# Patient Record
Sex: FEMALE | Race: WHITE | NOT HISPANIC OR LATINO | Employment: OTHER | URBAN - METROPOLITAN AREA
[De-identification: names, ages, dates, MRNs, and addresses within clinical notes are randomized per-mention and may not be internally consistent; named-entity substitution may affect disease eponyms.]

---

## 2017-09-28 ENCOUNTER — HOSPITAL ENCOUNTER (OUTPATIENT)
Dept: MAMMOGRAPHY | Facility: HOSPITAL | Age: 72
Discharge: HOME | End: 2017-09-28
Attending: NURSE PRACTITIONER | Admitting: NURSE PRACTITIONER

## 2018-11-26 ENCOUNTER — HOSPITAL ENCOUNTER (OUTPATIENT)
Dept: MAMMOGRAPHY | Facility: HOSPITAL | Age: 73
Discharge: HOME | End: 2018-11-26
Attending: FAMILY MEDICINE | Admitting: FAMILY MEDICINE

## 2019-06-20 ENCOUNTER — TELEPHONE (OUTPATIENT)
Dept: FAMILY MEDICINE CLINIC | Facility: CLINIC | Age: 74
End: 2019-06-20

## 2019-07-02 LAB
AMBIG ABBREV LP DEFAULT: NORMAL
CHOLEST SERPL-MCNC: 195 MG/DL (ref 100–199)
HDLC SERPL-MCNC: 39 MG/DL
LDLC SERPL CALC-MCNC: 114 MG/DL (ref 0–99)
TRIGL SERPL-MCNC: 212 MG/DL (ref 0–149)
VLDLC SERPL CALC-MCNC: 42 MG/DL (ref 5–40)

## 2019-07-06 ENCOUNTER — RESULTS ENCOUNTER (OUTPATIENT)
Dept: FAMILY MEDICINE CLINIC | Facility: CLINIC | Age: 74
End: 2019-07-06

## 2019-07-06 DIAGNOSIS — E78.49 OTHER HYPERLIPIDEMIA: ICD-10-CM

## 2019-10-29 ENCOUNTER — FLU SHOT (OUTPATIENT)
Dept: FAMILY MEDICINE CLINIC | Facility: CLINIC | Age: 74
End: 2019-10-29

## 2019-10-29 DIAGNOSIS — Z23 IMMUNIZATION, PNEUMOCOCCUS AND INFLUENZA: ICD-10-CM

## 2019-10-29 PROCEDURE — 90653 IIV ADJUVANT VACCINE IM: CPT | Performed by: FAMILY MEDICINE

## 2019-10-29 PROCEDURE — G0008 ADMIN INFLUENZA VIRUS VAC: HCPCS | Performed by: FAMILY MEDICINE

## 2019-11-25 ENCOUNTER — OFFICE VISIT (OUTPATIENT)
Dept: FAMILY MEDICINE CLINIC | Facility: CLINIC | Age: 74
End: 2019-11-25

## 2019-11-25 VITALS
SYSTOLIC BLOOD PRESSURE: 145 MMHG | WEIGHT: 206 LBS | HEIGHT: 66 IN | TEMPERATURE: 98.3 F | DIASTOLIC BLOOD PRESSURE: 72 MMHG | OXYGEN SATURATION: 94 % | BODY MASS INDEX: 33.11 KG/M2 | HEART RATE: 78 BPM

## 2019-11-25 DIAGNOSIS — R73.9 HYPERGLYCEMIA: ICD-10-CM

## 2019-11-25 DIAGNOSIS — E66.09 CLASS 1 OBESITY DUE TO EXCESS CALORIES WITHOUT SERIOUS COMORBIDITY WITH BODY MASS INDEX (BMI) OF 33.0 TO 33.9 IN ADULT: ICD-10-CM

## 2019-11-25 DIAGNOSIS — H69.83 DYSFUNCTION OF BOTH EUSTACHIAN TUBES: ICD-10-CM

## 2019-11-25 DIAGNOSIS — N18.30 CHRONIC RENAL INSUFFICIENCY, STAGE III (MODERATE) (HCC): ICD-10-CM

## 2019-11-25 DIAGNOSIS — E55.9 VITAMIN D DEFICIENCY: ICD-10-CM

## 2019-11-25 DIAGNOSIS — I10 ESSENTIAL HYPERTENSION: Primary | ICD-10-CM

## 2019-11-25 DIAGNOSIS — E78.2 MIXED HYPERLIPIDEMIA: ICD-10-CM

## 2019-11-25 DIAGNOSIS — R60.0 PEDAL EDEMA: ICD-10-CM

## 2019-11-25 DIAGNOSIS — J30.9 ALLERGIC RHINITIS, UNSPECIFIED SEASONALITY, UNSPECIFIED TRIGGER: ICD-10-CM

## 2019-11-25 PROBLEM — F32.A DEPRESSION: Status: ACTIVE | Noted: 2019-11-25

## 2019-11-25 PROBLEM — I47.1 PAROXYSMAL ATRIAL TACHYCARDIA (HCC): Status: ACTIVE | Noted: 2019-11-25

## 2019-11-25 PROBLEM — M85.80 OSTEOPENIA: Status: ACTIVE | Noted: 2017-08-03

## 2019-11-25 PROBLEM — I47.19 PAROXYSMAL ATRIAL TACHYCARDIA: Status: ACTIVE | Noted: 2019-11-25

## 2019-11-25 PROCEDURE — 99214 OFFICE O/P EST MOD 30 MIN: CPT | Performed by: FAMILY MEDICINE

## 2019-11-25 RX ORDER — METOPROLOL SUCCINATE 25 MG/1
25 TABLET, EXTENDED RELEASE ORAL DAILY
COMMUNITY

## 2019-11-25 RX ORDER — FENOFIBRATE 54 MG/1
54 TABLET ORAL DAILY
COMMUNITY
Start: 2019-07-01 | End: 2019-12-02

## 2019-11-25 RX ORDER — FLUTICASONE PROPIONATE 50 MCG
2 SPRAY, SUSPENSION (ML) NASAL DAILY
Qty: 1 BOTTLE | Refills: 3 | Status: SHIPPED | OUTPATIENT
Start: 2019-11-25 | End: 2021-09-07 | Stop reason: SDUPTHER

## 2019-11-25 NOTE — PROGRESS NOTES
Chief Complaint   Patient presents with   • Hypertension   • Hyperlipidemia       Subjective   History of Present Illness   Anjana Martínez is a 74 y.o. female. Pt here to establish care.  She is concerned about a red discoloration to her anterior lower legs that have been treated by Dr Starr and Dr Brwon.  She states that Dr Starr told her it was erythema nodosum Dr. Brown told her it was vascular. Pt states the areas have never felt like nodules or raised areas and do not hurt.     She reports she is currently taking fenofibrate, has taken simvastatin in the past and it caused knee and leg pain, uncertain if she has taken other statins.  Does not believe she has taken Crestor.    She has started weight watchers and is hoping weight loss will improve her blood pressure, the past couple of months she has lost from 225 down to 206 pounds     Pt has had her Flu shot this season.  States had a PNU shot a year ago checking chirp she had the PPV 23 on 8/3/2017.    The following portions of the patient's history were reviewed and updated as appropriate: allergies, current medications, past family history, past medical history, past social history, past surgical history and problem list.    Current Outpatient Medications on File Prior to Visit   Medication Sig   • Cholecalciferol (CVS D3) 50 MCG (2000 UT) capsule CVS D3 2000 UNIT CAPS   • fenofibrate (TRICOR) 54 MG tablet Take 54 mg by mouth Daily.   • metoprolol succinate XL (TOPROL-XL) 25 MG 24 hr tablet Take 25 mg by mouth Daily.     No current facility-administered medications on file prior to visit.          Review of Systems   Constitutional: Negative for appetite change, fatigue, fever and unexpected weight loss.   HENT: Positive for congestion, postnasal drip and rhinorrhea.    Eyes: Negative for visual disturbance.   Respiratory: Negative for cough, shortness of breath and wheezing.    Cardiovascular: Negative for chest pain, palpitations and leg swelling.  "  Gastrointestinal: Negative for abdominal pain, constipation, diarrhea, nausea, vomiting and indigestion.   Skin: Positive for rash.   Allergic/Immunologic: Positive for environmental allergies.   Neurological: Negative for numbness and headache.   Psychiatric/Behavioral: Negative for sleep disturbance and depressed mood.       Past Medical History:   Diagnosis Date   • Hyperlipidemia    • Hypertension        Past Surgical History:   Procedure Laterality Date   • PACEMAKER IMPLANTATION         Family History   Problem Relation Age of Onset   • Cancer Mother    • Cirrhosis Mother    • Heart disease Father    • Cirrhosis Brother    • No Known Problems Daughter    • No Known Problems Brother    • No Known Problems Daughter        Social History     Socioeconomic History   • Marital status:      Spouse name: Not on file   • Number of children: Not on file   • Years of education: Not on file   • Highest education level: Not on file   Tobacco Use   • Smoking status: Never Smoker   • Smokeless tobacco: Never Used   Substance and Sexual Activity   • Alcohol use: No     Frequency: Never   • Drug use: No       Objective   Vitals:    11/25/19 1436   BP: 145/72   BP Location: Left arm   Patient Position: Sitting   Cuff Size: Adult   Pulse: 78   Temp: 98.3 °F (36.8 °C)   TempSrc: Oral   SpO2: 94%   Weight: 93.4 kg (206 lb)   Height: 167.6 cm (65.98\")      Body mass index is 33.27 kg/m².    Physical Exam   Constitutional: She is oriented to person, place, and time. She appears well-developed and well-nourished.   HENT:   Head: Normocephalic and atraumatic.   cobblestoning of post pharynx, no exudate.   and mild edema of nasal mucosa  TM's retracted bilaterally   Eyes: Conjunctivae and EOM are normal. Pupils are equal, round, and reactive to light.   Neck: No JVD present. No thyromegaly present.   Cardiovascular: Normal rate, regular rhythm and normal heart sounds.   No murmur heard.  Pulmonary/Chest: Breath sounds normal. " She has no wheezes. She has no rales.   Musculoskeletal: She exhibits no edema.   Lymphadenopathy:     She has no cervical adenopathy.   Neurological: She is alert and oriented to person, place, and time.   Skin: Skin is warm and dry.   Mild non pitting edema and varicosities and spider veins of lower ext bilaterally, no exudate   Psychiatric: She has a normal mood and affect.   Nursing note and vitals reviewed.      Lab Results   Component Value Date    GLU 97 11/25/2019    BUN 25 11/25/2019    CREATININE 1.45 (H) 11/25/2019    EGFRIFNONA 36 (L) 11/25/2019    EGFRIFAFRI 42 (L) 11/25/2019     11/25/2019    K 4.7 11/25/2019     11/25/2019    CALCIUM 10.3 11/25/2019    ALBUMIN 4.9 (H) 11/25/2019    BILITOT 0.5 11/25/2019    ALKPHOS 60 11/25/2019    AST 25 11/25/2019    ALT 34 (H) 11/25/2019    CHLPL 197 11/25/2019    TRIG 164 (H) 11/25/2019    HDL 39 (L) 11/25/2019    VLDL 33 11/25/2019     (H) 11/25/2019    WBC 6.7 11/25/2019    RBC 4.99 11/25/2019    HCT 42.4 11/25/2019    MCV 85 11/25/2019    MCH 28.7 11/25/2019    TSH 2.460 11/25/2019    KUKU17UH 39.0 11/25/2019 7/1/2019 lipid panel cholesterol 195, triglycerides 212, HDL 39, , GFR 52;  A1c 4/4/2019 was 5.9, cholesterol 255, triglycerides 329, , HDL 34  The 10-year ASCVD risk score (Chelsea GERRY Jr., et al., 2013) is: 20.1%    Values used to calculate the score:      Age: 74 years      Sex: Female      Is Non- : No      Diabetic: No      Tobacco smoker: No      Systolic Blood Pressure: 145 mmHg      Is BP treated: Yes      HDL Cholesterol: 39 mg/dL      Total Cholesterol: 197 mg/dL    Assessment/Plan   Diagnoses and all orders for this visit:    1. Essential hypertension (Primary)  -     Lipid Panel  -     TSH  -     CBC & Differential    2. Chronic renal insufficiency, stage III (moderate) (CMS/HCC)    3. Class 1 obesity due to excess calories without serious comorbidity with body mass index (BMI) of 33.0  to 33.9 in adult    4. Hyperglycemia  -     Hemoglobin A1c  -     Comprehensive Metabolic Panel    5. Vitamin D deficiency  -     Vitamin D 25 Hydroxy    6. Pedal edema    7. Mixed hyperlipidemia  -     Lipid Panel    8. Allergic rhinitis, unspecified seasonality, unspecified trigger  -     fluticasone (FLONASE) 50 MCG/ACT nasal spray; 2 sprays into the nostril(s) as directed by provider Daily.  Dispense: 1 bottle; Refill: 3    9. Dysfunction of both eustachian tubes  -     fluticasone (FLONASE) 50 MCG/ACT nasal spray; 2 sprays into the nostril(s) as directed by provider Daily.  Dispense: 1 bottle; Refill: 3        HTN above goal pt declines increase in antihypertensive therapy stating she is wanting to continue weight loss and will follow DASH diet and monitor with home cuff, if remains >140/90 she will contact office to adjust medication    Advised LE vascular insufficiency is most likely cause of redness of legs and low salt diet, compression hose, elevation  and weight loss are the most effective treatments.     Adding Fluticasone for ETD, nasal congestion and seasonal allergies.      Return in about 6 months (around 5/25/2020).      AVS given with handouts appropriate to diagnoses addressed inclluding ETD and Dyslipidemia

## 2019-11-25 NOTE — PATIENT INSTRUCTIONS
Dyslipidemia  Dyslipidemia is an imbalance of waxy, fat-like substances (lipids) in the blood. The body needs lipids in small amounts. Dyslipidemia often involves a high level of cholesterol or triglycerides, which are types of lipids.  Common forms of dyslipidemia include:  · High levels of LDL cholesterol. LDL is the type of cholesterol that causes fatty deposits (plaques) to build up in the blood vessels that carry blood away from your heart (arteries).  · Low levels of HDL cholesterol. HDL cholesterol is the type of cholesterol that protects against heart disease. High levels of HDL remove the LDL buildup from arteries.  · High levels of triglycerides. Triglycerides are a fatty substance in the blood that is linked to a buildup of plaques in the arteries.  What are the causes?  Primary dyslipidemia is caused by changes (mutations) in genes that are passed down through families (inherited). These mutations cause several types of dyslipidemia.  Secondary dyslipidemia is caused by lifestyle choices and diseases that lead to dyslipidemia, such as:  · Eating a diet that is high in animal fat.  · Not getting enough exercise.  · Having diabetes, kidney disease, liver disease, or thyroid disease.  · Drinking large amounts of alcohol.  · Using certain medicines.  What increases the risk?  You are more likely to develop this condition if you are an older man or if you are a woman who has gone through menopause. Other risk factors include:  · Having a family history of dyslipidemia.  · Taking certain medicines, including birth control pills, steroids, some diuretics, and beta-blockers.  · Smoking cigarettes.  · Eating a high-fat diet.  · Having certain medical conditions such as diabetes, polycystic ovary syndrome (PCOS), kidney disease, liver disease, or hypothyroidism.  · Not exercising regularly.  · Being overweight or obese with too much belly fat.  What are the signs or symptoms?  In most cases, dyslipidemia does not  usually cause any symptoms.  In severe cases, very high lipid levels can cause:  · Fatty bumps under the skin (xanthomas).  · White or gray ring around the black center (pupil) of the eye.  Very high triglyceride levels can cause inflammation of the pancreas (pancreatitis).  How is this diagnosed?  Your health care provider may diagnose dyslipidemia based on a routine blood test (fasting blood test). Because most people do not have symptoms of the condition, this blood testing (lipid profile) is done on adults age 20 and older and is repeated every 5 years. This test checks:  · Total cholesterol. This measures the total amount of cholesterol in your blood, including LDL cholesterol, HDL cholesterol, and triglycerides. A healthy number is below 200.  · LDL cholesterol. The target number for LDL cholesterol is different for each person, depending on individual risk factors. Ask your health care provider what your LDL cholesterol should be.  · HDL cholesterol. An HDL level of 60 or higher is best because it helps to protect against heart disease. A number below 40 for men or below 50 for women increases the risk for heart disease.  · Triglycerides. A healthy triglyceride number is below 150.  If your lipid profile is abnormal, your health care provider may do other blood tests.  How is this treated?  Treatment depends on the type of dyslipidemia that you have and your other risk factors for heart disease and stroke. Your health care provider will have a target range for your lipid levels based on this information.  For many people, this condition may be treated by lifestyle changes, such as diet and exercise. Your health care provider may recommend that you:  · Get regular exercise.  · Make changes to your diet.  · Quit smoking if you smoke.  If diet changes and exercise do not help you reach your goals, your health care provider may also prescribe medicine to lower lipids. The most commonly prescribed type of medicine  lowers your LDL cholesterol (statin drug). If you have a high triglyceride level, your provider may prescribe another type of drug (fibrate) or an omega-3 fish oil supplement, or both.  Follow these instructions at home:    Eating and drinking  · Follow instructions from your health care provider or dietitian about eating or drinking restrictions.  · Eat a healthy diet as told by your health care provider. This can help you reach and maintain a healthy weight, lower your LDL cholesterol, and raise your HDL cholesterol. This may include:  ? Limiting your calories, if you are overweight.  ? Eating more fruits, vegetables, whole grains, fish, and lean meats.  ? Limiting saturated fat, trans fat, and cholesterol.  · If you drink alcohol:  ? Limit how much you use.  ? Be aware of how much alcohol is in your drink. In the [place] one drink equals one 12 oz bottle of beer (355 mL), one 5 oz glass of wine (148 mL), or one 1½ oz glass of hard liquor (44 mL).  · Do not drink alcohol if:  ? Your health care provider tells you not to drink.  ? You are pregnant, may be pregnant, or are planning to become pregnant.  Activity  · Get regular exercise. Start an exercise and strength training program as told by your health care provider. Ask your health care provider what activities are safe for you. Your health care provider may recommend:  ? 30 minutes of aerobic activity 4-6 days a week. Brisk walking is an example of aerobic activity.  ? Strength training 2 days a week.  General instructions  · Do not use any products that contain nicotine or tobacco, such as cigarettes, e-cigarettes, and chewing tobacco. If you need help quitting, ask your health care provider.  · Take over-the-counter and prescription medicines only as told by your health care provider. This includes supplements.  · Keep all follow-up visits as told by your health care provider.  Contact a health care provider if:  · You are:  ? Having trouble sticking to your  exercise or diet plan.  ? Struggling to quit smoking or control your use of alcohol.  Summary  · Dyslipidemia often involves a high level of cholesterol or triglycerides, which are types of lipids.  · Treatment depends on the type of dyslipidemia that you have and your other risk factors for heart disease and stroke.  · For many people, treatment starts with lifestyle changes, such as diet and exercise.  · Your health care provider may prescribe medicine to lower lipids.  This information is not intended to replace advice given to you by your health care provider. Make sure you discuss any questions you have with your health care provider.  Document Released: 12/23/2014 Document Revised: 08/12/2019 Document Reviewed: 07/19/2019  [employer] Interactive Patient Education © 2019 [employer]

## 2019-11-26 LAB
25(OH)D3+25(OH)D2 SERPL-MCNC: 39 NG/ML (ref 30–100)
ALBUMIN SERPL-MCNC: 4.9 G/DL (ref 3.5–4.8)
ALBUMIN/GLOB SERPL: 2 {RATIO} (ref 1.2–2.2)
ALP SERPL-CCNC: 60 IU/L (ref 39–117)
ALT SERPL-CCNC: 34 IU/L (ref 0–32)
AST SERPL-CCNC: 25 IU/L (ref 0–40)
BASOPHILS # BLD AUTO: 0 X10E3/UL (ref 0–0.2)
BASOPHILS NFR BLD AUTO: 0 %
BILIRUB SERPL-MCNC: 0.5 MG/DL (ref 0–1.2)
BUN SERPL-MCNC: 25 MG/DL (ref 8–27)
BUN/CREAT SERPL: 17 (ref 12–28)
CALCIUM SERPL-MCNC: 10.3 MG/DL (ref 8.7–10.3)
CHLORIDE SERPL-SCNC: 103 MMOL/L (ref 96–106)
CHOLEST SERPL-MCNC: 197 MG/DL (ref 100–199)
CO2 SERPL-SCNC: 22 MMOL/L (ref 20–29)
CREAT SERPL-MCNC: 1.45 MG/DL (ref 0.57–1)
EOSINOPHIL # BLD AUTO: 0.4 X10E3/UL (ref 0–0.4)
EOSINOPHIL NFR BLD AUTO: 6 %
ERYTHROCYTE [DISTWIDTH] IN BLOOD BY AUTOMATED COUNT: 13.3 % (ref 12.3–15.4)
GLOBULIN SER CALC-MCNC: 2.5 G/DL (ref 1.5–4.5)
GLUCOSE SERPL-MCNC: 97 MG/DL (ref 65–99)
HBA1C MFR BLD: 5.8 % (ref 4.8–5.6)
HCT VFR BLD AUTO: 42.4 % (ref 34–46.6)
HDLC SERPL-MCNC: 39 MG/DL
HGB BLD-MCNC: 14.3 G/DL (ref 11.1–15.9)
IMM GRANULOCYTES # BLD AUTO: 0 X10E3/UL (ref 0–0.1)
IMM GRANULOCYTES NFR BLD AUTO: 0 %
LDLC SERPL CALC-MCNC: 125 MG/DL (ref 0–99)
LYMPHOCYTES # BLD AUTO: 1.8 X10E3/UL (ref 0.7–3.1)
LYMPHOCYTES NFR BLD AUTO: 27 %
MCH RBC QN AUTO: 28.7 PG (ref 26.6–33)
MCHC RBC AUTO-ENTMCNC: 33.7 G/DL (ref 31.5–35.7)
MCV RBC AUTO: 85 FL (ref 79–97)
MONOCYTES # BLD AUTO: 0.4 X10E3/UL (ref 0.1–0.9)
MONOCYTES NFR BLD AUTO: 6 %
NEUTROPHILS # BLD AUTO: 4.1 X10E3/UL (ref 1.4–7)
NEUTROPHILS NFR BLD AUTO: 61 %
PLATELET # BLD AUTO: 269 X10E3/UL (ref 150–450)
POTASSIUM SERPL-SCNC: 4.7 MMOL/L (ref 3.5–5.2)
PROT SERPL-MCNC: 7.4 G/DL (ref 6–8.5)
RBC # BLD AUTO: 4.99 X10E6/UL (ref 3.77–5.28)
SODIUM SERPL-SCNC: 140 MMOL/L (ref 134–144)
TRIGL SERPL-MCNC: 164 MG/DL (ref 0–149)
TSH SERPL DL<=0.005 MIU/L-ACNC: 2.46 UIU/ML (ref 0.45–4.5)
VLDLC SERPL CALC-MCNC: 33 MG/DL (ref 5–40)
WBC # BLD AUTO: 6.7 X10E3/UL (ref 3.4–10.8)

## 2019-11-27 DIAGNOSIS — E78.2 MIXED HYPERLIPIDEMIA: Primary | ICD-10-CM

## 2019-11-27 DIAGNOSIS — R73.9 HYPERGLYCEMIA: ICD-10-CM

## 2019-11-27 DIAGNOSIS — I10 ESSENTIAL HYPERTENSION: ICD-10-CM

## 2019-11-30 PROBLEM — H69.93 DYSFUNCTION OF BOTH EUSTACHIAN TUBES: Status: ACTIVE | Noted: 2019-11-30

## 2019-11-30 PROBLEM — E78.2 MIXED HYPERLIPIDEMIA: Status: ACTIVE | Noted: 2019-11-30

## 2019-11-30 PROBLEM — J30.9 ALLERGIC RHINITIS: Status: ACTIVE | Noted: 2019-11-30

## 2019-11-30 PROBLEM — H69.83 DYSFUNCTION OF BOTH EUSTACHIAN TUBES: Status: ACTIVE | Noted: 2019-11-30

## 2019-12-02 DIAGNOSIS — R73.9 HYPERGLYCEMIA: ICD-10-CM

## 2019-12-02 DIAGNOSIS — Z12.31 BREAST CANCER SCREENING BY MAMMOGRAM: Primary | ICD-10-CM

## 2019-12-02 DIAGNOSIS — I10 ESSENTIAL HYPERTENSION: ICD-10-CM

## 2019-12-02 DIAGNOSIS — Z12.39 SCREENING BREAST EXAMINATION: Primary | ICD-10-CM

## 2019-12-02 DIAGNOSIS — E78.2 MIXED HYPERLIPIDEMIA: ICD-10-CM

## 2019-12-02 RX ORDER — ROSUVASTATIN CALCIUM 10 MG/1
10 TABLET, COATED ORAL DAILY
Qty: 90 TABLET | Refills: 0 | Status: SHIPPED | OUTPATIENT
Start: 2019-12-02 | End: 2020-02-12 | Stop reason: SDUPTHER

## 2019-12-02 RX ORDER — ROSUVASTATIN CALCIUM 10 MG/1
10 TABLET, COATED ORAL DAILY
COMMUNITY
End: 2019-12-02 | Stop reason: SDUPTHER

## 2019-12-12 ENCOUNTER — HOSPITAL ENCOUNTER (OUTPATIENT)
Dept: MAMMOGRAPHY | Facility: HOSPITAL | Age: 74
Discharge: HOME | End: 2019-12-12
Admitting: FAMILY MEDICINE

## 2019-12-12 DIAGNOSIS — Z12.31 BREAST CANCER SCREENING BY MAMMOGRAM: ICD-10-CM

## 2019-12-12 PROCEDURE — 77067 SCR MAMMO BI INCL CAD: CPT

## 2019-12-12 PROCEDURE — 77063 BREAST TOMOSYNTHESIS BI: CPT

## 2019-12-13 RX ORDER — FENOFIBRATE 54 MG/1
TABLET ORAL
Qty: 90 TABLET | Refills: 1 | OUTPATIENT
Start: 2019-12-13

## 2020-02-07 ENCOUNTER — RESULTS ENCOUNTER (OUTPATIENT)
Dept: FAMILY MEDICINE CLINIC | Facility: CLINIC | Age: 75
End: 2020-02-07

## 2020-02-07 DIAGNOSIS — R73.9 HYPERGLYCEMIA: ICD-10-CM

## 2020-02-07 DIAGNOSIS — I10 ESSENTIAL HYPERTENSION: ICD-10-CM

## 2020-02-07 DIAGNOSIS — E78.2 MIXED HYPERLIPIDEMIA: ICD-10-CM

## 2020-02-12 DIAGNOSIS — Z12.39 SCREENING BREAST EXAMINATION: ICD-10-CM

## 2020-02-12 RX ORDER — ROSUVASTATIN CALCIUM 10 MG/1
10 TABLET, COATED ORAL DAILY
Qty: 90 TABLET | Refills: 1 | Status: SHIPPED | OUTPATIENT
Start: 2020-02-12 | End: 2020-05-26

## 2020-02-12 NOTE — TELEPHONE ENCOUNTER
Pt is running out of this medications and will run out before her appt.  She is not having any problems with this medication.

## 2020-02-29 LAB
ALBUMIN SERPL-MCNC: 4.5 G/DL (ref 3.7–4.7)
ALBUMIN/GLOB SERPL: 1.9 {RATIO} (ref 1.2–2.2)
ALP SERPL-CCNC: 81 IU/L (ref 39–117)
ALT SERPL-CCNC: 20 IU/L (ref 0–32)
AST SERPL-CCNC: 16 IU/L (ref 0–40)
BILIRUB SERPL-MCNC: 0.5 MG/DL (ref 0–1.2)
BUN SERPL-MCNC: 27 MG/DL (ref 8–27)
BUN/CREAT SERPL: 20 (ref 12–28)
CALCIUM SERPL-MCNC: 9.5 MG/DL (ref 8.7–10.3)
CHLORIDE SERPL-SCNC: 107 MMOL/L (ref 96–106)
CHOLEST SERPL-MCNC: 116 MG/DL (ref 100–199)
CO2 SERPL-SCNC: 20 MMOL/L (ref 20–29)
CREAT SERPL-MCNC: 1.36 MG/DL (ref 0.57–1)
GLOBULIN SER CALC-MCNC: 2.4 G/DL (ref 1.5–4.5)
GLUCOSE SERPL-MCNC: 95 MG/DL (ref 65–99)
HBA1C MFR BLD: 5.8 % (ref 4.8–5.6)
HDLC SERPL-MCNC: 33 MG/DL
LDLC SERPL CALC-MCNC: 50 MG/DL (ref 0–99)
POTASSIUM SERPL-SCNC: 4.4 MMOL/L (ref 3.5–5.2)
PROT SERPL-MCNC: 6.9 G/DL (ref 6–8.5)
SODIUM SERPL-SCNC: 142 MMOL/L (ref 134–144)
TRIGL SERPL-MCNC: 163 MG/DL (ref 0–149)
VLDLC SERPL CALC-MCNC: 33 MG/DL (ref 5–40)

## 2020-03-01 ENCOUNTER — RESULTS ENCOUNTER (OUTPATIENT)
Dept: FAMILY MEDICINE CLINIC | Facility: CLINIC | Age: 75
End: 2020-03-01

## 2020-03-01 DIAGNOSIS — E78.2 MIXED HYPERLIPIDEMIA: ICD-10-CM

## 2020-03-01 DIAGNOSIS — I10 ESSENTIAL HYPERTENSION: ICD-10-CM

## 2020-03-01 DIAGNOSIS — R73.9 HYPERGLYCEMIA: ICD-10-CM

## 2020-03-03 DIAGNOSIS — I10 ESSENTIAL HYPERTENSION: ICD-10-CM

## 2020-03-04 RX ORDER — METOPROLOL SUCCINATE 25 MG/1
TABLET, EXTENDED RELEASE ORAL
Qty: 90 TABLET | OUTPATIENT
Start: 2020-03-04

## 2020-05-26 ENCOUNTER — OFFICE VISIT (OUTPATIENT)
Dept: FAMILY MEDICINE CLINIC | Facility: CLINIC | Age: 75
End: 2020-05-26

## 2020-05-26 DIAGNOSIS — E78.2 MIXED HYPERLIPIDEMIA: ICD-10-CM

## 2020-05-26 DIAGNOSIS — R73.02 IGT (IMPAIRED GLUCOSE TOLERANCE): ICD-10-CM

## 2020-05-26 DIAGNOSIS — E55.9 VITAMIN D DEFICIENCY: ICD-10-CM

## 2020-05-26 DIAGNOSIS — I10 ESSENTIAL HYPERTENSION: ICD-10-CM

## 2020-05-26 DIAGNOSIS — N18.30 CHRONIC RENAL INSUFFICIENCY, STAGE III (MODERATE) (HCC): Primary | ICD-10-CM

## 2020-05-26 PROCEDURE — 99442 PR PHYS/QHP TELEPHONE EVALUATION 11-20 MIN: CPT | Performed by: FAMILY MEDICINE

## 2020-05-26 RX ORDER — ROSUVASTATIN CALCIUM 5 MG/1
5 TABLET, COATED ORAL DAILY
Qty: 90 TABLET | Refills: 0
Start: 2020-05-26 | End: 2020-06-22

## 2020-05-26 NOTE — PROGRESS NOTES
Chief Complaint   Patient presents with   • Hypertension   • Hyperlipidemia       Subjective     Anjana Martínez is a 75 y.o. female.   You have chosen to receive care through a telephone visit. Do you consent to use a telephone visit for your medical care today?Yes.  I spent 18 minutes on phone with patient.    Patient is scheduled to follow-up on labs that she had done in February. Patient states she had labs in February and was to f/u in March and her appointment was cancelled (possibly due to the pandemic).   Hypertension - Patient states she does not check her bp at home very often. It has been a couple of months since she has checked it, she doesn't remember what the numbers were.  She believes she used to have systolics running in the 160s however looking through old records and finding mostly 130s to 145.  She does report one home blood pressure reading of 134/71 but thought this must of been a false low reading and did not believe her cuff was working correctly and has not been checking regularly since.    Hyperlipidemia, patient's was changed from fenofibrate to Crestor in November.  She is currently taking 10 mg and denies any side effects of myalgias or otherwise.  Incredible improvement in cholesterol as reported below.    Impaired glucose tolerance, A1c 5.8 is stable.    Obesity, patient reports she had lost about 30 pounds from her high around 225, but regained about 8 pounds.  She claims her current weight is 197 pounds this is down 9 pounds since November.      The following portions of the patient's history were reviewed and updated as appropriate: allergies, current medications, past family history, past medical history, past social history, past surgical history and problem list.    Current Outpatient Medications on File Prior to Visit   Medication Sig   • Cholecalciferol (CVS D3) 50 MCG (2000 UT) capsule CVS D3 2000 UNIT CAPS   • fluticasone (FLONASE) 50 MCG/ACT nasal spray 2 sprays into the  nostril(s) as directed by provider Daily.   • metoprolol succinate XL (TOPROL-XL) 25 MG 24 hr tablet Take 25 mg by mouth Daily.   • [DISCONTINUED] rosuvastatin (CRESTOR) 10 MG tablet Take 1 tablet by mouth Daily.     No current facility-administered medications on file prior to visit.      Medications Discontinued During This Encounter   Medication Reason   • rosuvastatin (CRESTOR) 10 MG tablet        Review of Systems   Constitutional: Negative for appetite change, fatigue, fever and unexpected weight loss.   HENT: Positive for congestion, postnasal drip and rhinorrhea.    Eyes: Negative for visual disturbance.   Respiratory: Negative for cough, shortness of breath and wheezing.    Cardiovascular: Negative for chest pain, palpitations and leg swelling.   Gastrointestinal: Negative for abdominal pain, constipation, diarrhea, nausea, vomiting and indigestion.   Skin: Positive for rash.   Allergic/Immunologic: Positive for environmental allergies.   Neurological: Negative for numbness and headache.   Psychiatric/Behavioral: Negative for sleep disturbance and depressed mood.        Objective   There were no vitals filed for this visit.   There is no height or weight on file to calculate BMI.    Physical Exam   Constitutional: She is oriented to person, place, and time.   Pulmonary/Chest:   No conversational dyspnea  No cough during encounter   Neurological: She is alert and oriented to person, place, and time.   Psychiatric: She has a normal mood and affect. Thought content normal.       Lab Results   Component Value Date    GLU 95 02/28/2020    BUN 27 02/28/2020    CREATININE 1.36 (H) 02/28/2020    EGFRIFNONA 39 (L) 02/28/2020    EGFRIFAFRI 45 (L) 02/28/2020     02/28/2020    K 4.4 02/28/2020     (H) 02/28/2020    CALCIUM 9.5 02/28/2020    ALBUMIN 4.5 02/28/2020    BILITOT 0.5 02/28/2020    ALKPHOS 81 02/28/2020    AST 16 02/28/2020    ALT 20 02/28/2020    CHLPL 116 02/28/2020    TRIG 163 (H) 02/28/2020     HDL 33 (L) 02/28/2020    VLDL 33 02/28/2020    LDL 50 02/28/2020      Lab Results   Component Value Date    HGBA1C 5.8 (H) 02/28/2020    HGBA1C 5.8 (H) 11/25/2019        Procedures     Assessment/Plan   Diagnoses and all orders for this visit:    1. Chronic renal insufficiency, stage III (moderate) (CMS/[facility]) (Primary)  -     Comprehensive Metabolic Panel    2. Essential hypertension    3. Mixed hyperlipidemia  -     rosuvastatin (CRESTOR) 5 MG tablet; Take 1 tablet by mouth Daily.  Dispense: 90 tablet; Refill: 0  -     Lipid Panel    4. IGT (impaired glucose tolerance)  -     Hemoglobin A1c    5. Vitamin D deficiency  -     Vitamin D 25 Hydroxy      Hypertension, advised patient to do some home blood pressure checks and contact office with readings, to ensure at goal.  Continue to record and bring to follow-up appointment in a month.    Hyperlipidemia, patient had significant improvement starting Crestor 10 mg, reducing to 5 mg daily and rechecking labs in about 1 month.    Renal insufficiency, GFR has remained stable, did stress that keeping blood pressure at goal, preventing onset of diabetes, and weight reduction are best treatments to avoid progression of renal disease.      Medications Discontinued During This Encounter   Medication Reason   • rosuvastatin (CRESTOR) 10 MG tablet           Return in about 4 weeks (around 6/23/2020) for Recheck, htn, cholesterol, IGT, vitamin D deficiency, labs prior.        AVS with updated medication list available on [employer]

## 2020-06-18 LAB
25(OH)D3+25(OH)D2 SERPL-MCNC: 43.4 NG/ML (ref 30–100)
ALBUMIN SERPL-MCNC: 4.4 G/DL (ref 3.7–4.7)
ALBUMIN/GLOB SERPL: 1.8 {RATIO} (ref 1.2–2.2)
ALP SERPL-CCNC: 75 IU/L (ref 39–117)
ALT SERPL-CCNC: 21 IU/L (ref 0–32)
AST SERPL-CCNC: 16 IU/L (ref 0–40)
BILIRUB SERPL-MCNC: 0.6 MG/DL (ref 0–1.2)
BUN SERPL-MCNC: 20 MG/DL (ref 8–27)
BUN/CREAT SERPL: 17 (ref 12–28)
CALCIUM SERPL-MCNC: 10 MG/DL (ref 8.7–10.3)
CHLORIDE SERPL-SCNC: 104 MMOL/L (ref 96–106)
CHOLEST SERPL-MCNC: 139 MG/DL (ref 100–199)
CO2 SERPL-SCNC: 24 MMOL/L (ref 20–29)
CREAT SERPL-MCNC: 1.19 MG/DL (ref 0.57–1)
GLOBULIN SER CALC-MCNC: 2.4 G/DL (ref 1.5–4.5)
GLUCOSE SERPL-MCNC: 95 MG/DL (ref 65–99)
HBA1C MFR BLD: 5.8 % (ref 4.8–5.6)
HDLC SERPL-MCNC: 37 MG/DL
LDLC SERPL CALC-MCNC: 61 MG/DL (ref 0–99)
POTASSIUM SERPL-SCNC: 4.7 MMOL/L (ref 3.5–5.2)
PROT SERPL-MCNC: 6.8 G/DL (ref 6–8.5)
SODIUM SERPL-SCNC: 142 MMOL/L (ref 134–144)
TRIGL SERPL-MCNC: 203 MG/DL (ref 0–149)
VLDLC SERPL CALC-MCNC: 41 MG/DL (ref 5–40)

## 2020-06-22 ENCOUNTER — OFFICE VISIT (OUTPATIENT)
Dept: FAMILY MEDICINE CLINIC | Facility: CLINIC | Age: 75
End: 2020-06-22

## 2020-06-22 VITALS
TEMPERATURE: 97.3 F | HEART RATE: 70 BPM | BODY MASS INDEX: 31.98 KG/M2 | OXYGEN SATURATION: 97 % | HEIGHT: 66 IN | DIASTOLIC BLOOD PRESSURE: 70 MMHG | WEIGHT: 199 LBS | SYSTOLIC BLOOD PRESSURE: 139 MMHG

## 2020-06-22 DIAGNOSIS — R73.02 IGT (IMPAIRED GLUCOSE TOLERANCE): ICD-10-CM

## 2020-06-22 DIAGNOSIS — E55.9 VITAMIN D DEFICIENCY: ICD-10-CM

## 2020-06-22 DIAGNOSIS — N18.30 CHRONIC RENAL INSUFFICIENCY, STAGE III (MODERATE) (HCC): ICD-10-CM

## 2020-06-22 DIAGNOSIS — Z95.0 PRESENCE OF CARDIAC PACEMAKER: ICD-10-CM

## 2020-06-22 DIAGNOSIS — I44.30 AV BLOCK: ICD-10-CM

## 2020-06-22 DIAGNOSIS — I10 ESSENTIAL HYPERTENSION: Primary | ICD-10-CM

## 2020-06-22 DIAGNOSIS — E78.2 MIXED HYPERLIPIDEMIA: ICD-10-CM

## 2020-06-22 DIAGNOSIS — H61.23 HEARING LOSS DUE TO CERUMEN IMPACTION, BILATERAL: ICD-10-CM

## 2020-06-22 PROCEDURE — 99214 OFFICE O/P EST MOD 30 MIN: CPT | Performed by: FAMILY MEDICINE

## 2020-06-22 RX ORDER — ROSUVASTATIN CALCIUM 5 MG/1
5 TABLET, COATED ORAL DAILY
Qty: 90 TABLET | Refills: 3 | Status: SHIPPED | OUTPATIENT
Start: 2020-06-22 | End: 2020-10-27 | Stop reason: SINTOL

## 2020-06-22 RX ORDER — LISINOPRIL 10 MG/1
10 TABLET ORAL DAILY
Qty: 90 TABLET | Refills: 3 | Status: SHIPPED | OUTPATIENT
Start: 2020-06-22 | End: 2021-05-12

## 2020-06-22 NOTE — PROGRESS NOTES
Chief Complaint   Patient presents with   • Hypertension   • Hyperlipidemia   • vitamin D def       Subjective     Anjana Martínez is a 75 y.o. female.  Patient presents for follow up on HTN, chronic renal insufficiency, stage III, impaired glucose tolerance, cholesterol, and vitamin D deficiency.  Patient had labs 5 days ago.    HTN, rare home BP checks 133/58- 146/56. occasionally feel light headed when 1st stand, no presyncope. Fell once 2-3 years ago, now carefull to hold on to something if feel dizzy  And will wait until sensation passes.     Pacemaker 1997 for arrythmia. Will obtain records from [facility]. Annual pacemaker OV,  Last saw NP Mrs Callahan. And q 3 month pacemaker telephone checks      Hyperlipidemia, last visit in May, did decrease Lipitor from 10 to 5 mg due to far exceeding goal on higher dose.  Stage III renal impairment, GFR going back to June 2016, 4 years ago, was 48 has fluctuated from 36-57 since that time.  The following portions of the patient's history were reviewed and updated as appropriate: allergies, current medications, past family history, past medical history, past social history, past surgical history and problem list.    Current Outpatient Medications on File Prior to Visit   Medication Sig   • Cholecalciferol (CVS D3) 50 MCG (2000 UT) capsule CVS D3 2000 UNIT CAPS   • fluticasone (FLONASE) 50 MCG/ACT nasal spray 2 sprays into the nostril(s) as directed by provider Daily.   • metoprolol succinate XL (TOPROL-XL) 25 MG 24 hr tablet Take 25 mg by mouth Daily.   • [DISCONTINUED] rosuvastatin (CRESTOR) 5 MG tablet Take 1 tablet by mouth Daily.     No current facility-administered medications on file prior to visit.      Medications Discontinued During This Encounter   Medication Reason   • rosuvastatin (CRESTOR) 5 MG tablet        Review of Systems   Constitutional: Negative for appetite change, fatigue, fever and unexpected weight loss.   HENT:  "Positive for congestion, hearing loss, postnasal drip, rhinorrhea and voice change.    Eyes: Negative for visual disturbance.   Respiratory: Negative for cough, shortness of breath and wheezing.    Cardiovascular: Negative for chest pain, palpitations ( rare racing aobut 2 times a year) and leg swelling.   Gastrointestinal: Negative for abdominal pain, constipation, diarrhea, nausea, vomiting and indigestion.   Skin: Positive for rash.   Allergic/Immunologic: Positive for environmental allergies.   Neurological: Positive for dizziness. Negative for numbness and headache.   Psychiatric/Behavioral: Negative for sleep disturbance and depressed mood.        Objective   Vitals:    06/22/20 0921   BP: 139/70   BP Location: Left arm   Patient Position: Sitting   Cuff Size: Adult   Pulse: 70   Temp: 97.3 °F (36.3 °C)   TempSrc: Infrared   SpO2: 97%   Weight: 90.3 kg (199 lb)   Height: 167.6 cm (65.98\")      Body mass index is 32.14 kg/m².    Physical Exam   Constitutional: She is oriented to person, place, and time. She appears well-developed and well-nourished.   HENT:   Head: Normocephalic and atraumatic.   Large amount of yellow cerumen, completely occluding external auditory canals bilaterally.  This was flushed by my assistant with near complete removal with only a small few flakes of a pale wax remaining.  Tympanic membranes mildly retracted, minimally injected following procedure, no middle ear effusion.   Eyes: Pupils are equal, round, and reactive to light. Conjunctivae and EOM are normal.   Neck: No JVD present. No thyromegaly present.   Cardiovascular: Normal rate, regular rhythm and normal heart sounds.   No murmur heard.  Pulmonary/Chest: Breath sounds normal. She has no wheezes. She has no rales.   Musculoskeletal: She exhibits no edema.   Lymphadenopathy:     She has no cervical adenopathy.   Neurological: She is alert and oriented to person, place, and time.   Skin: Skin is warm and dry.   Mild non pitting " edema and varicosities and spider veins of lower ext bilaterally   Psychiatric: She has a normal mood and affect.   Nursing note and vitals reviewed.      Lab Results   Component Value Date    GLU 95 06/17/2020    BUN 20 06/17/2020    CREATININE 1.19 (H) 06/17/2020    EGFRIFNONA 45 (L) 06/17/2020    EGFRIFAFRI 52 (L) 06/17/2020     06/17/2020    K 4.7 06/17/2020     06/17/2020    CALCIUM 10.0 06/17/2020    ALBUMIN 4.4 06/17/2020    BILITOT 0.6 06/17/2020    ALKPHOS 75 06/17/2020    AST 16 06/17/2020    ALT 21 06/17/2020    CHLPL 139 06/17/2020    TRIG 203 (H) 06/17/2020    HDL 37 (L) 06/17/2020    VLDL 41 (H) 06/17/2020    LDL 61 06/17/2020    HWXF62DP 43.4 06/17/2020      Lab Results   Component Value Date    HGBA1C 5.8 (H) 06/17/2020    HGBA1C 5.8 (H) 02/28/2020    HGBA1C 5.8 (H) 11/25/2019        Procedures     Assessment/Plan   Diagnoses and all orders for this visit:    1. Essential hypertension (Primary)  -     lisinopril (PRINIVIL,ZESTRIL) 10 MG tablet; Take 1 tablet by mouth Daily.  Dispense: 90 tablet; Refill: 3  -     Comprehensive Metabolic Panel; Future    2. Chronic renal insufficiency, stage III (moderate) (CMS/[facility])  -     lisinopril (PRINIVIL,ZESTRIL) 10 MG tablet; Take 1 tablet by mouth Daily.  Dispense: 90 tablet; Refill: 3  -     Comprehensive Metabolic Panel; Future    3. Mixed hyperlipidemia  -     rosuvastatin (CRESTOR) 5 MG tablet; Take 1 tablet by mouth Daily.  Dispense: 90 tablet; Refill: 3    4. IGT (impaired glucose tolerance)    5. Vitamin D deficiency    6. Hearing loss due to cerumen impaction, bilateral  -     Ear Cerumen Removal    7. Presence of cardiac pacemaker    8. AV block          Medications Discontinued During This Encounter   Medication Reason   • rosuvastatin (CRESTOR) 5 MG tablet    Reviewed labs with patient.  Triglycerides still slightly elevated but otherwise cholesterol is great on reduced dose of Crestor- continue (reduced due to potential risk to  kidneys)  Hypertension, just above goal, not orthostatic today due to history of arrhythmia/AV block and pacemaker will not increase beta-blocker but add a low-dose of an ACE which may also have renal protective effect.  Chronic renal insufficiency, stable, continue efforts at preventing diabetes and controlling blood pressure.  Requesting cardiology records from [facility].  Dizziness upon standing is likely due to occasional orthostatic hypotension, advised to stay hydrated.     Return in about 3 months (around 10/2/2020) for Recheck hypertension stage III chronic renal insufficiency and other chronic medical conditions.labs.        AVS given with information on orthostatic hypotension, and earwax buildup.

## 2020-06-22 NOTE — PATIENT INSTRUCTIONS
Orthostatic Hypotension  Blood pressure is a measurement of how strongly, or weakly, your blood is pressing against the walls of your arteries. Orthostatic hypotension is a sudden drop in blood pressure that happens when you quickly change positions, such as when you get up from sitting or lying down.  Arteries are blood vessels that carry blood from your heart throughout your body. When blood pressure is too low, you may not get enough blood to your brain or to the rest of your organs. This can cause weakness, light-headedness, rapid heartbeat, and fainting. This can last for just a few seconds or for up to a few minutes. Orthostatic hypotension is usually not a serious problem. However, if it happens frequently or gets worse, it may be a sign of something more serious.  What are the causes?  This condition may be caused by:  · Sudden changes in posture, such as standing up quickly after you have been sitting or lying down.  · Blood loss.  · Loss of body fluids (dehydration).  · Heart problems.  · Hormone (endocrine) problems.  · Pregnancy.  · Severe infection.  · Lack of certain nutrients.  · Severe allergic reactions (anaphylaxis).  · Certain medicines, such as blood pressure medicine or medicines that make the body lose excess fluids (diuretics). Sometimes, this condition can be caused by not taking medicine as directed, such as taking too much of a certain medicine.  What increases the risk?  The following factors may make you more likely to develop this condition:  · Age. Risk increases as you get older.  · Conditions that affect the heart or the central nervous system.  · Taking certain medicines, such as blood pressure medicine or diuretics.  · Being pregnant.  What are the signs or symptoms?  Symptoms of this condition may include:  · Weakness.  · Light-headedness.  · Dizziness.  · Blurred vision.  · Fatigue.  · Rapid heartbeat.  · Fainting, in severe cases.  How is this diagnosed?  This condition is  "diagnosed based on:  · Your medical history.  · Your symptoms.  · Your blood pressure measurement. Your health care provider will check your blood pressure when you are:  ? Lying down.  ? Sitting.  ? Standing.  A blood pressure reading is recorded as two numbers, such as \"120 over 80\" (or 120/80). The first (\"top\") number is called the systolic pressure. It is a measure of the pressure in your arteries as your heart beats. The second (\"bottom\") number is called the diastolic pressure. It is a measure of the pressure in your arteries when your heart relaxes between beats. Blood pressure is measured in a unit called mm Hg. Healthy blood pressure for most adults is 120/80. If your blood pressure is below 90/60, you may be diagnosed with hypotension.  Other information or tests that may be used to diagnose orthostatic hypotension include:  · Your other vital signs, such as your heart rate and temperature.  · Blood tests.  · Tilt table test. For this test, you will be safely secured to a table that moves you from a lying position to an upright position. Your heart rhythm and blood pressure will be monitored during the test.  How is this treated?  This condition may be treated by:  · Changing your diet. This may involve eating more salt (sodium) or drinking more water.  · Taking medicines to raise your blood pressure.  · Changing the dosage of certain medicines you are taking that might be lowering your blood pressure.  · Wearing compression stockings. These stockings help to prevent blood clots and reduce swelling in your legs.  In some cases, you may need to go to the hospital for:  · Fluid replacement. This means you will receive fluids through an IV.  · Blood replacement. This means you will receive donated blood through an IV (transfusion).  · Treating an infection or heart problems, if this applies.  · Monitoring. You may need to be monitored while medicines that you are taking wear off.  Follow these instructions " at home:  Eating and drinking    · Drink enough fluid to keep your urine pale yellow.  · Eat a healthy diet, and follow instructions from your health care provider about eating or drinking restrictions. A healthy diet includes:  ? Fresh fruits and vegetables.  ? Whole grains.  ? Lean meats.  ? Low-fat dairy products.  · Eat extra salt only as directed. Do not add extra salt to your diet unless your health care provider told you to do that.  · Eat frequent, small meals.  · Avoid standing up suddenly after eating.  Medicines  · Take over-the-counter and prescription medicines only as told by your health care provider.  ? Follow instructions from your health care provider about changing the dosage of your current medicines, if this applies.  ? Do not stop or adjust any of your medicines on your own.  General instructions    · Wear compression stockings as told by your health care provider.  · Get up slowly from lying down or sitting positions. This gives your blood pressure a chance to adjust.  · Avoid hot showers and excessive heat as directed by your health care provider.  · Return to your normal activities as told by your health care provider. Ask your health care provider what activities are safe for you.  · Do not use any products that contain nicotine or tobacco, such as cigarettes, e-cigarettes, and chewing tobacco. If you need help quitting, ask your health care provider.  · Keep all follow-up visits as told by your health care provider. This is important.  Contact a health care provider if you:  · Vomit.  · Have diarrhea.  · Have a fever for more than 2-3 days.  · Feel more thirsty than usual.  · Feel weak and tired.  Get help right away if you:  · Have chest pain.  · Have a fast or irregular heartbeat.  · Develop numbness in any part of your body.  · Cannot move your arms or your legs.  · Have trouble speaking.  · Become sweaty or feel light-headed.  · Faint.  · Feel short of breath.  · Have trouble staying  awake.  · Feel confused.  Summary  · Orthostatic hypotension is a sudden drop in blood pressure that happens when you quickly change positions.  · Orthostatic hypotension is usually not a serious problem.  · It is diagnosed by having your blood pressure taken lying down, sitting, and then standing.  · It may be treated by changing your diet or adjusting your medicines.  This information is not intended to replace advice given to you by your health care provider. Make sure you discuss any questions you have with your health care provider.  Document Released: 12/08/2003 Document Revised: 06/13/2019 Document Reviewed: 06/13/2019  [employer] Patient Education © 2020 [employer]    Earwax Buildup, Adult  The ears produce a substance called earwax that helps keep bacteria out of the ear and protects the skin in the ear canal. Occasionally, earwax can build up in the ear and cause discomfort or hearing loss.  What increases the risk?  This condition is more likely to develop in people who:  · Are male.  · Are elderly.  · Naturally produce more earwax.  · Clean their ears often with cotton swabs.  · Use earplugs often.  · Use in-ear headphones often.  · Wear hearing aids.  · Have narrow ear canals.  · Have earwax that is overly thick or sticky.  · Have eczema.  · Are dehydrated.  · Have excess hair in the ear canal.  What are the signs or symptoms?  Symptoms of this condition include:  · Reduced or muffled hearing.  · A feeling of fullness in the ear or feeling that the ear is plugged.  · Fluid coming from the ear.  · Ear pain.  · Ear itch.  · Ringing in the ear.  · Coughing.  · An obvious piece of earwax that can be seen inside the ear canal.  How is this diagnosed?  This condition may be diagnosed based on:  · Your symptoms.  · Your medical history.  · An ear exam. During the exam, your health care provider will look into your ear with an instrument called an otoscope.  You may have tests, including a hearing test.  How  is this treated?  This condition may be treated by:  · Using ear drops to soften the earwax.  · Having the earwax removed by a health care provider. The health care provider may:  ? Flush the ear with water.  ? Use an instrument that has a loop on the end (curette).  ? Use a suction device.  · Surgery to remove the wax buildup. This may be done in severe cases.  Follow these instructions at home:    · Take over-the-counter and prescription medicines only as told by your health care provider.  · Do not put any objects, including cotton swabs, into your ear. You can clean the opening of your ear canal with a washcloth or facial tissue.  · Follow instructions from your health care provider about cleaning your ears. Do not over-clean your ears.  · Drink enough fluid to keep your urine clear or pale yellow. This will help to thin the earwax.  · Keep all follow-up visits as told by your health care provider. If earwax builds up in your ears often or if you use hearing aids, consider seeing your health care provider for routine, preventive ear cleanings. Ask your health care provider how often you should schedule your cleanings.  · If you have hearing aids, clean them according to instructions from the  and your health care provider.  Contact a health care provider if:  · You have ear pain.  · You develop a fever.  · You have blood, pus, or other fluid coming from your ear.  · You have hearing loss.  · You have ringing in your ears that does not go away.  · Your symptoms do not improve with treatment.  · You feel like the room is spinning (vertigo).  Summary  · Earwax can build up in the ear and cause discomfort or hearing loss.  · The most common symptoms of this condition include reduced or muffled hearing and a feeling of fullness in the ear or feeling that the ear is plugged.  · This condition may be diagnosed based on your symptoms, your medical history, and an ear exam.  · This condition may be treated by  using ear drops to soften the earwax or by having the earwax removed by a health care provider.  · Do not put any objects, including cotton swabs, into your ear. You can clean the opening of your ear canal with a washcloth or facial tissue.  This information is not intended to replace advice given to you by your health care provider. Make sure you discuss any questions you have with your health care provider.  Document Released: 01/25/2006 Document Revised: 11/30/2018 Document Reviewed: 02/28/2018  [employer] Patient Education © 2020 [employer]

## 2020-09-20 ENCOUNTER — RESULTS ENCOUNTER (OUTPATIENT)
Dept: FAMILY MEDICINE CLINIC | Facility: CLINIC | Age: 75
End: 2020-09-20

## 2020-09-20 DIAGNOSIS — I10 ESSENTIAL HYPERTENSION: ICD-10-CM

## 2020-09-20 DIAGNOSIS — N18.30 CHRONIC RENAL INSUFFICIENCY, STAGE III (MODERATE) (HCC): ICD-10-CM

## 2020-09-23 LAB
ALBUMIN SERPL-MCNC: 4.3 G/DL (ref 3.7–4.7)
ALBUMIN/GLOB SERPL: 1.7 {RATIO} (ref 1.2–2.2)
ALP SERPL-CCNC: 79 IU/L (ref 39–117)
ALT SERPL-CCNC: 16 IU/L (ref 0–32)
AST SERPL-CCNC: 14 IU/L (ref 0–40)
BILIRUB SERPL-MCNC: 0.5 MG/DL (ref 0–1.2)
BUN SERPL-MCNC: 29 MG/DL (ref 8–27)
BUN/CREAT SERPL: 21 (ref 12–28)
CALCIUM SERPL-MCNC: 9.5 MG/DL (ref 8.7–10.3)
CHLORIDE SERPL-SCNC: 108 MMOL/L (ref 96–106)
CO2 SERPL-SCNC: 21 MMOL/L (ref 20–29)
CREAT SERPL-MCNC: 1.37 MG/DL (ref 0.57–1)
GLOBULIN SER CALC-MCNC: 2.5 G/DL (ref 1.5–4.5)
GLUCOSE SERPL-MCNC: 106 MG/DL (ref 65–99)
POTASSIUM SERPL-SCNC: 4.5 MMOL/L (ref 3.5–5.2)
PROT SERPL-MCNC: 6.8 G/DL (ref 6–8.5)
SODIUM SERPL-SCNC: 143 MMOL/L (ref 134–144)

## 2020-09-24 NOTE — PROGRESS NOTES
Chief Complaint   Patient presents with   • Hypertension   • Hyperlipidemia       Subjective     Anjana Martínez is a 75 y.o. female.  She presents today to follow-up on chronic medical conditions including hypertension and renal insufficiency.  She did have labs and are reviewed as below.  Patient states to check blood pressure at home, and has some readings with her today.  She had some elevated readings in July but only brings one reading from August 17 at 110/61 and 1 reading from September 17th at 143/55. Upset by weight gain.     Have not seen a nephrologist ibuprophen 200 mg up to 3 in a week, for tooth pain or head aches. Do follow with Dentist, have had bridge work 3 months ago, have a broken tooth that needs to be extracted going to have partial plate at that time..     Mixed hyperlipidemia, did reduce crestor from 10 to 5 mg daily 3 months ago due to total cholesterol exceeding goal and chronic renal insufficiency.    The following portions of the patient's history were reviewed and updated as appropriate: allergies, current medications, past family history, past medical history, past social history, past surgical history and problem list.    Current Outpatient Medications on File Prior to Visit   Medication Sig   • Cholecalciferol (CVS D3) 50 MCG (2000 UT) capsule CVS D3 2000 UNIT CAPS   • fluticasone (FLONASE) 50 MCG/ACT nasal spray 2 sprays into the nostril(s) as directed by provider Daily.   • lisinopril (PRINIVIL,ZESTRIL) 10 MG tablet Take 1 tablet by mouth Daily.   • metoprolol succinate XL (TOPROL-XL) 25 MG 24 hr tablet Take 25 mg by mouth Daily.   • rosuvastatin (CRESTOR) 5 MG tablet Take 1 tablet by mouth Daily.     No current facility-administered medications on file prior to visit.      There are no discontinued medications.    Review of Systems   Constitutional: Positive for unexpected weight gain. Negative for fatigue and fever.   HENT: Positive for dental problem.    Respiratory: Negative  "for cough and shortness of breath.    Cardiovascular: Positive for leg swelling ( Mild last week has resolved again.). Negative for chest pain and palpitations.   Gastrointestinal: Negative.    Skin: Negative for rash.   Psychiatric/Behavioral: Negative for depressed mood.        Objective   Vitals:    09/25/20 0936   BP: 122/66   BP Location: Left arm   Patient Position: Sitting   Cuff Size: Adult   Pulse: 75   Temp: 97.3 °F (36.3 °C)   TempSrc: Infrared   SpO2: 97%   Weight: 92.5 kg (204 lb)   Height: 167.6 cm (65.98\")      Body mass index is 32.95 kg/m².   Up 4 pounds since last visit  Physical Exam  Vitals signs and nursing note reviewed.   Constitutional:       General: She is not in acute distress.     Appearance: She is well-developed.   HENT:      Head: Normocephalic and atraumatic.   Cardiovascular:      Rate and Rhythm: Normal rate and regular rhythm.      Heart sounds: No murmur.   Pulmonary:      Effort: Pulmonary effort is normal.      Breath sounds: Normal breath sounds. No wheezing.   Musculoskeletal: Normal range of motion.      Right lower leg: Edema present.      Left lower leg: Edema ( ) present.      Comments: Bilateral 1+ nonpitting edema at ankles   Skin:     General: Skin is warm and dry.      Findings: No rash.   Neurological:      Mental Status: She is alert and oriented to person, place, and time.         Lab Results   Component Value Date     (H) 09/22/2020    BUN 29 (H) 09/22/2020    CREATININE 1.37 (H) 09/22/2020    EGFRIFNONA 38 (L) 09/22/2020    EGFRIFAFRI 44 (L) 09/22/2020     09/22/2020    K 4.5 09/22/2020     (H) 09/22/2020    CALCIUM 9.5 09/22/2020    ALBUMIN 4.3 09/22/2020    BILITOT 0.5 09/22/2020    ALKPHOS 79 09/22/2020    AST 14 09/22/2020    ALT 16 09/22/2020      Hemoglobin A1C   Date Value Ref Range Status   06/17/2020 5.8 (H) 4.8 - 5.6 % Final     Comment:              Prediabetes: 5.7 - 6.4           Diabetes: >6.4           Glycemic control for adults " with diabetes: <7.0     02/28/2020 5.8 (H) 4.8 - 5.6 % Final     Comment:              Prediabetes: 5.7 - 6.4           Diabetes: >6.4           Glycemic control for adults with diabetes: <7.0     11/25/2019 5.8 (H) 4.8 - 5.6 % Final     Comment:              Prediabetes: 5.7 - 6.4           Diabetes: >6.4           Glycemic control for adults with diabetes: <7.0          Procedures     Assessment/Plan   Diagnoses and all orders for this visit:    1. Chronic renal insufficiency, stage III (moderate) (CMS/HCC) (Primary)  -     Comprehensive Metabolic Panel; Future  -     MicroAlbumin, Urine, Random - Urine, Clean Catch; Future    2. Essential hypertension  -     Comprehensive Metabolic Panel; Future    3. Paroxysmal atrial tachycardia (CMS/[facility])    4. Vitamin D deficiency  -     Vitamin D 25 Hydroxy; Future    5. Pedal edema    6. Mixed hyperlipidemia  -     Lipid Panel; Future    7. Hyperglycemia  -     Hemoglobin A1c; Future      Stop ibuprofen and avoid all other oral  NSAID's.  Recommend Tylenol if needed for headache or dental pain.  Declines referral to nephrologist.   Influenza vaccination given today.  Patient declines Shingrix today, will check with insurance to see if there is any coverage, and may consider in future      There are no discontinued medications.       Return in about 3 months (around 12/25/2020) for Recheck hypertension, cholesterol, vitamin D deficiency, and renal insufficiency.    Education reference material relevant to visit available in AVS through [facility] or printed copy given.

## 2020-09-25 ENCOUNTER — OFFICE VISIT (OUTPATIENT)
Dept: FAMILY MEDICINE CLINIC | Facility: CLINIC | Age: 75
End: 2020-09-25

## 2020-09-25 VITALS
HEIGHT: 66 IN | TEMPERATURE: 97.3 F | OXYGEN SATURATION: 97 % | DIASTOLIC BLOOD PRESSURE: 66 MMHG | HEART RATE: 75 BPM | SYSTOLIC BLOOD PRESSURE: 122 MMHG | BODY MASS INDEX: 32.78 KG/M2 | WEIGHT: 204 LBS

## 2020-09-25 DIAGNOSIS — R73.9 HYPERGLYCEMIA: ICD-10-CM

## 2020-09-25 DIAGNOSIS — E78.2 MIXED HYPERLIPIDEMIA: ICD-10-CM

## 2020-09-25 DIAGNOSIS — I10 ESSENTIAL HYPERTENSION: ICD-10-CM

## 2020-09-25 DIAGNOSIS — E55.9 VITAMIN D DEFICIENCY: ICD-10-CM

## 2020-09-25 DIAGNOSIS — Z23 NEED FOR INFLUENZA VACCINATION: ICD-10-CM

## 2020-09-25 DIAGNOSIS — R60.0 PEDAL EDEMA: ICD-10-CM

## 2020-09-25 DIAGNOSIS — I47.1 PAROXYSMAL ATRIAL TACHYCARDIA (HCC): ICD-10-CM

## 2020-09-25 DIAGNOSIS — N18.30 CHRONIC RENAL INSUFFICIENCY, STAGE III (MODERATE) (HCC): Primary | ICD-10-CM

## 2020-09-25 PROCEDURE — 90694 VACC AIIV4 NO PRSRV 0.5ML IM: CPT | Performed by: FAMILY MEDICINE

## 2020-09-25 PROCEDURE — G0008 ADMIN INFLUENZA VIRUS VAC: HCPCS | Performed by: FAMILY MEDICINE

## 2020-09-25 PROCEDURE — 99214 OFFICE O/P EST MOD 30 MIN: CPT | Performed by: FAMILY MEDICINE

## 2020-09-25 NOTE — PATIENT INSTRUCTIONS
Chronic Kidney Disease, Adult  Chronic kidney disease (CKD) occurs when the kidneys become damaged slowly over a long period of time. The kidneys are a pair of organs that do many important jobs in the body, including:  · Removing waste and extra fluid from the blood to make urine.  · Making hormones that maintain the amount of fluid in tissues and blood vessels.  · Maintaining the right amount of fluids and chemicals in the body.  A small amount of kidney damage may not cause problems, but a large amount of damage may make it hard or impossible for the kidneys to work the way they should. If steps are not taken to slow down kidney damage or to stop it from getting worse, the kidneys may stop working permanently (end-stage renal disease or ESRD). Most of the time, CKD does not go away, but it can often be controlled. People who have CKD are usually able to live normal lives.  What are the causes?  The most common causes of this condition are diabetes and high blood pressure (hypertension). Other causes include:  · Heart and blood vessel (cardiovascular) disease.  · Kidney diseases, such as:  ? Glomerulonephritis.  ? Interstitial nephritis.  ? Polycystic kidney disease.  ? Renal vascular disease.  · Diseases that affect the immune system.  · Genetic diseases.  · Medicines that damage the kidneys, such as anti-inflammatory medicines.  · Being around or being in contact with poisonous (toxic) substances.  · A kidney or urinary infection that occurs again and again (recurs).  · Vasculitis. This is swelling or inflammation of the blood vessels.  · A problem with urine flow that may be caused by:  ? Cancer.  ? Having kidney stones more than one time.  ? An enlarged prostate, in males.  What increases the risk?  You are more likely to develop this condition if you:  · Are older than age 60.  · Are female.  · Are -American, , , , or .  · Are a current or former  smoker.  · Are obese.  · Have a family history of kidney disease or failure.  · Often take medicines that are damaging to the kidneys.  What are the signs or symptoms?  Symptoms of this condition include:  · Swelling (edema) of the face, legs, ankles, or feet.  · Tiredness (lethargy) and having less energy.  · Nausea or vomiting.  · Confusion or trouble concentrating.  · Problems with urination, such as:  ? Painful or burning feeling during urination.  ? Decreased urine production.  ? Frequent urination, especially at night.  ? Bloody urine.  · Muscle twitches and cramps, especially in the legs.  · Shortness of breath.  · Weakness.  · Loss of appetite.  · Metallic taste in the mouth.  · Trouble sleeping.  · Dry, itchy skin.  · A low blood count (anemia).  · Pale lining of the eyelids and surface of the eye (conjunctiva).  Symptoms develop slowly and may not be obvious until the kidney damage becomes severe. It is possible to have kidney disease for years without having any symptoms.  How is this diagnosed?  This condition may be diagnosed based on:  · Blood tests.  · Urine tests.  · Imaging tests, such as an ultrasound or CT scan.  · A test in which a sample of tissue is removed from the kidneys to be examined under a microscope (kidney biopsy).  These test results will help your health care provider determine how serious the CKD is.  How is this treated?  There is no cure for most cases of this condition, but treatment usually relieves symptoms and prevents or slows the progression of the disease. Treatment may include:  · Making diet changes, which may require you to avoid alcohol, salty foods (sodium), and foods that are high in potassium, calcium, and protein.  · Medicines:  ? To lower blood pressure.  ? To control blood glucose.  ? To relieve anemia.  ? To relieve swelling.  ? To protect your bones.  ? To improve the balance of electrolytes in your blood.  · Removing toxic waste from the body through types of  dialysis, if the kidneys can no longer do their job (kidney failure).  · Managing any other conditions that are causing your CKD or making it worse.  Follow these instructions at home:  Medicines  · Take over-the-counter and prescription medicines only as told by your health care provider. The dose of some medicines that you take may need to be adjusted.  · Do not take any new medicines unless approved by your health care provider. Many medicines can worsen your kidney damage.  · Do not take any vitamin and mineral supplements unless approved by your health care provider. Many nutritional supplements can worsen your kidney damage.  General instructions  · Follow your prescribed diet as told by your health care provider.  · Do not use any products that contain nicotine or tobacco, such as cigarettes and e-cigarettes. If you need help quitting, ask your health care provider.  · Monitor and track your blood pressure at home. Report changes in your blood pressure as told by your health care provider.  · If you are being treated for diabetes, monitor and track your blood sugar (blood glucose) levels as told by your health care provider.  · Maintain a healthy weight. If you need help with this, ask your health care provider.  · Start or continue an exercise plan. Exercise at least 30 minutes a day, 5 days a week.  · Keep your immunizations up to date as told by your health care provider.  · Keep all follow-up visits as told by your health care provider. This is important.  Where to find more information  · American Association of Kidney Patients: www.aakp.org  · National Kidney Foundation: www.kidney.org  · American Kidney Fund: www.akfinc.org  · [facility]: www.lifeoptions.org and www.kidneyschool.org  Contact a health care provider if:  · Your symptoms get worse.  · You develop new symptoms.  Get help right away if:  · You develop symptoms of ESRD, which include:  ? Headaches.  ? Numbness in the  hands or feet.  ? Easy bruising.  ? Frequent hiccups.  ? Chest pain.  ? Shortness of breath.  ? Lack of menstruation, in women.  · You have a fever.  · You have decreased urine production.  · You have pain or bleeding when you urinate.  Summary  · Chronic kidney disease (CKD) occurs when the kidneys become damaged slowly over a long period of time.  · The most common causes of this condition are diabetes and high blood pressure (hypertension).  · There is no cure for most cases of this condition, but treatment usually relieves symptoms and prevents or slows the progression of the disease. Treatment may include a combination of medicines and lifestyle changes.  This information is not intended to replace advice given to you by your health care provider. Make sure you discuss any questions you have with your health care provider.  Document Released: 09/26/2009 Document Revised: 11/30/2018 Document Reviewed: 01/25/2018  [employer] Patient Education © 2020 [employer]    Food Basics for Chronic Kidney Disease  When your kidneys are not working well, they cannot remove waste and excess substances from your blood as effectively as they did before. This can lead to a buildup and imbalance of these substances, which can worsen kidney damage and affect how your body functions. Certain foods lead to a buildup of these substances in the body. By changing your diet as recommended by your diet and nutrition specialist (dietitian) or health care provider, you could help prevent further kidney damage and delay or prevent the need for dialysis.  What are tips for following this plan?  General instructions    · Work with your health care provider and dietitian to develop a meal plan that is right for you. Foods you can eat, limit, or avoid will be different for each person depending on the stage of kidney disease and any other existing health conditions.  · Talk with your health care provider about whether you should take a vitamin  and mineral supplement.  · Use standard measuring cups and spoons to measure servings of foods. Use a kitchen scale to measure portions of protein foods.  · If directed by your health care provider, avoid drinking too much fluid. Measure and count all liquids, including water, ice, soups, flavored gelatin, and frozen desserts such as popsicles or ice cream.  Reading food labels  · Check the amount of sodium in foods. Choose foods that have less than 300 milligrams (mg) per serving.  · Check the ingredient list for phosphorus or potassium-based additives or preservatives.  · Check the amount of saturated and trans fat. Limit or avoid these fats as told by your dietitian.  Shopping  · Avoid buying foods that are:  ? Processed, frozen, or prepackaged.  ? Calcium-enriched or fortified.  · Do not buy foods that have salt or sodium listed among the first five ingredients.  · Do not buy canned vegetables.  Cooking  · Replace animal proteins, such as meat, fish, eggs, or dairy, with plant proteins from beans, nuts, and soy.  ? Use soy milk instead of cow's milk.  ? Add beans or tofu to soups, casseroles, or pasta dishes instead of meat.  · Soak vegetables, such as potatoes, before cooking to reduce potassium. To do this:  ? Peel and cut into small pieces.  ? Soak in warm water for at least 2 hours. For every 1 cup of vegetables, use 10 cups of water.  ? Drain and rinse with warm water.  ? Boil for at least 5 minutes.  Meal planning  · Limit the amount of protein from plant and animal sources you eat each day.  · Do not add salt to food when cooking or before eating.  · Eat meals and snacks at around the same time each day.  If you have diabetes:  · If you have diabetes (diabetes mellitus) and chronic kidney disease, it is important to keep your blood glucose in the target range recommended by your health care provider. Follow your diabetes management plan. This may include:  ? Checking your blood glucose  regularly.  ? Taking oral medicines, insulin, or both.  ? Exercising for at least 30 minutes on 5 or more days each week, or as told by your health care provider.  ? Tracking how many servings of carbohydrates you eat at each meal.  · You may be given specific guidelines on how much of certain foods and nutrients you may eat, depending on your stage of kidney disease and whether you have high blood pressure (hypertension). Follow your meal plan as told by your dietitian.  What nutrients should be limited?  The items listed are not a complete list. Talk with your dietitian about what dietary choices are best for you.  Potassium  Potassium affects how steadily your heart beats. If too much potassium builds up in your blood, it can cause an irregular heartbeat or even a heart attack.  You may need to eat less potassium, depending on your blood potassium levels and the stage of kidney disease. Talk to your dietitian about how much potassium you may have each day.  You may need to limit or avoid foods that are high in potassium, such as:  · Milk and soy milk.  · Fruits, such as bananas, papaya, apricots, nectarines, melon, prunes, raisins, kiwi, and oranges.  · Vegetables, such as potatoes, sweet potatoes, yams, tomatoes, leafy greens, beets, okra, avocado, pumpkin, and winter squash.  · White and lima beans.  Phosphorus  Phosphorus is a mineral found in your bones. A balance between calcium and phosphorous is needed to build and maintain healthy bones. Too much phosphorus pulls calcium from your bones. This can make your bones weak and more likely to break. Too much phosphorus can also make your skin itch.  You may need to eat less phosphorus depending on your blood phosphorus levels and the stage of kidney disease. Talk to your dietitian about how much potassium you may have each day. You may need to take medicine to lower your blood phosphorus levels if diet changes do not help.  You may need to limit or avoid foods  that are high in phosphorus, such as:  · Milk and dairy products.  · Dried beans and peas.  · Tofu, soy milk, and other soy-based meat replacements.  · Pamela.  · Nuts and peanut butter.  · Meat, poultry, and fish.  · Bran cereals and oatmeals.  Protein  Protein helps you to make and keep muscle. It also helps in the repair of your body’s cells and tissues. One of the natural breakdown products of protein is a waste product called urea. When your kidneys are not working properly, they cannot remove wastes, such as urea, like they did before you developed chronic kidney disease. Reducing how much protein you eat can help prevent a buildup of urea in your blood.  Depending on your stage of kidney disease, you may need to limit foods that are high in protein. Sources of animal protein include:  · Meat (all types).  · Fish and seafood.  · Poultry.  · Eggs.  · Dairy.  Other protein foods include:  · Beans and legumes.  · Nuts and nut butter.  · Soy and tofu.  Sodium  Sodium, which is found in salt, helps maintain a healthy balance of fluids in your body. Too much sodium can increase your blood pressure and have a negative effect on the function of your heart and lungs. Too much sodium can also cause your body to retain too much fluid, making your kidneys work harder.  Most people should have less than 2,300 milligrams (mg) of sodium each day. If you have hypertension, you may need to limit your sodium to 1,500 mg each day. Talk to your dietitian about how much sodium you may have each day.  You may need to limit or avoid foods that are high in sodium, such as:  · Salt seasonings.  · Soy sauce.  · Cured and processed meats.  · Salted crackers and snack foods.  · Fast food.  · Canned soups and most canned foods.  · Pickled foods.  · Vegetable juice.  · Boxed mixes or ready-to-eat boxed meals and side dishes.  · Bottled dressings, sauces, and marinades.  Summary  · Chronic kidney disease can lead to a buildup and imbalance  "of waste and excess substances in the body. Certain foods lead to a buildup of these substances. By adjusting your intake of these foods, you could help prevent more kidney damage and delay or prevent the need for dialysis.  · Food adjustments are different for each person with chronic kidney disease. Work with a dietitian to set up nutrient goals and a meal plan that is right for you.  · If you have diabetes and chronic kidney disease, it is important to keep your blood glucose in the target range recommended by your health care provider.  This information is not intended to replace advice given to you by your health care provider. Make sure you discuss any questions you have with your health care provider.  Document Released: 03/09/2004 Document Revised: 04/09/2020 Document Reviewed: 12/13/2017  [employer] Patient Education © 2020 [employer]    DASH Eating Plan  DASH stands for \"Dietary Approaches to Stop Hypertension.\" The DASH eating plan is a healthy eating plan that has been shown to reduce high blood pressure (hypertension). It may also reduce your risk for type 2 diabetes, heart disease, and stroke. The DASH eating plan may also help with weight loss.  What are tips for following this plan?    General guidelines  · Avoid eating more than 2,300 mg (milligrams) of salt (sodium) a day. If you have hypertension, you may need to reduce your sodium intake to 1,500 mg a day.  · Limit alcohol intake to no more than 1 drink a day for nonpregnant women and 2 drinks a day for men. One drink equals 12 oz of beer, 5 oz of wine, or 1½ oz of hard liquor.  · Work with your health care provider to maintain a healthy body weight or to lose weight. Ask what an ideal weight is for you.  · Get at least 30 minutes of exercise that causes your heart to beat faster (aerobic exercise) most days of the week. Activities may include walking, swimming, or biking.  · Work with your health care provider or diet and nutrition specialist " "(dietitian) to adjust your eating plan to your individual calorie needs.  Reading food labels    · Check food labels for the amount of sodium per serving. Choose foods with less than 5 percent of the Daily Value of sodium. Generally, foods with less than 300 mg of sodium per serving fit into this eating plan.  · To find whole grains, look for the word \"whole\" as the first word in the ingredient list.  Shopping  · Buy products labeled as \"low-sodium\" or \"no salt added.\"  · Buy fresh foods. Avoid canned foods and premade or frozen meals.  Cooking  · Avoid adding salt when cooking. Use salt-free seasonings or herbs instead of table salt or sea salt. Check with your health care provider or pharmacist before using salt substitutes.  · Do not herring foods. Cook foods using healthy methods such as baking, boiling, grilling, and broiling instead.  · Cook with heart-healthy oils, such as olive, canola, soybean, or sunflower oil.  Meal planning  · Eat a balanced diet that includes:  ? 5 or more servings of fruits and vegetables each day. At each meal, try to fill half of your plate with fruits and vegetables.  ? Up to 6-8 servings of whole grains each day.  ? Less than 6 oz of lean meat, poultry, or fish each day. A 3-oz serving of meat is about the same size as a deck of cards. One egg equals 1 oz.  ? 2 servings of low-fat dairy each day.  ? A serving of nuts, seeds, or beans 5 times each week.  ? Heart-healthy fats. Healthy fats called Omega-3 fatty acids are found in foods such as flaxseeds and coldwater fish, like sardines, salmon, and mackerel.  · Limit how much you eat of the following:  ? Canned or prepackaged foods.  ? Food that is high in trans fat, such as fried foods.  ? Food that is high in saturated fat, such as fatty meat.  ? Sweets, desserts, sugary drinks, and other foods with added sugar.  ? Full-fat dairy products.  · Do not salt foods before eating.  · Try to eat at least 2 vegetarian meals each week.  · Eat " more home-cooked food and less restaurant, buffet, and fast food.  · When eating at a restaurant, ask that your food be prepared with less salt or no salt, if possible.  What foods are recommended?  The items listed may not be a complete list. Talk with your dietitian about what dietary choices are best for you.  Grains  Whole-grain or whole-wheat bread. Whole-grain or whole-wheat pasta. Brown rice. Oatmeal. Quinoa. Bulgur. Whole-grain and low-sodium cereals. Zandra bread. Low-fat, low-sodium crackers. Whole-wheat flour tortillas.  Vegetables  Fresh or frozen vegetables (raw, steamed, roasted, or grilled). Low-sodium or reduced-sodium tomato and vegetable juice. Low-sodium or reduced-sodium tomato sauce and tomato paste. Low-sodium or reduced-sodium canned vegetables.  Fruits  All fresh, dried, or frozen fruit. Canned fruit in natural juice (without added sugar).  Meat and other protein foods  Skinless chicken or turkey. Ground chicken or turkey. Pork with fat trimmed off. Fish and seafood. Egg whites. Dried beans, peas, or lentils. Unsalted nuts, nut butters, and seeds. Unsalted canned beans. Lean cuts of beef with fat trimmed off. Low-sodium, lean deli meat.  Dairy  Low-fat (1%) or fat-free (skim) milk. Fat-free, low-fat, or reduced-fat cheeses. Nonfat, low-sodium ricotta or cottage cheese. Low-fat or nonfat yogurt. Low-fat, low-sodium cheese.  Fats and oils  Soft margarine without trans fats. Vegetable oil. Low-fat, reduced-fat, or light mayonnaise and salad dressings (reduced-sodium). Canola, safflower, olive, soybean, and sunflower oils. Avocado.  Seasoning and other foods  Herbs. Spices. Seasoning mixes without salt. Unsalted popcorn and pretzels. Fat-free sweets.  What foods are not recommended?  The items listed may not be a complete list. Talk with your dietitian about what dietary choices are best for you.  Grains  Baked goods made with fat, such as croissants, muffins, or some breads. Dry pasta or rice meal  packs.  Vegetables  Creamed or fried vegetables. Vegetables in a cheese sauce. Regular canned vegetables (not low-sodium or reduced-sodium). Regular canned tomato sauce and paste (not low-sodium or reduced-sodium). Regular tomato and vegetable juice (not low-sodium or reduced-sodium). Pickles. Olives.  Fruits  Canned fruit in a light or heavy syrup. Fried fruit. Fruit in cream or butter sauce.  Meat and other protein foods  Fatty cuts of meat. Ribs. Fried meat. Ram. Sausage. Bologna and other processed lunch meats. Salami. Fatback. Hotdogs. Bratwurst. Salted nuts and seeds. Canned beans with added salt. Canned or smoked fish. Whole eggs or egg yolks. Chicken or turkey with skin.  Dairy  Whole or 2% milk, cream, and half-and-half. Whole or full-fat cream cheese. Whole-fat or sweetened yogurt. Full-fat cheese. Nondairy creamers. Whipped toppings. Processed cheese and cheese spreads.  Fats and oils  Butter. Stick margarine. Lard. Shortening. Ghee. Ram fat. Tropical oils, such as coconut, palm kernel, or palm oil.  Seasoning and other foods  Salted popcorn and pretzels. Onion salt, garlic salt, seasoned salt, table salt, and sea salt. Worcestershire sauce. Tartar sauce. Barbecue sauce. Teriyaki sauce. Soy sauce, including reduced-sodium. Steak sauce. Canned and packaged gravies. Fish sauce. Oyster sauce. Cocktail sauce. Horseradish that you find on the shelf. Ketchup. Mustard. Meat flavorings and tenderizers. Bouillon cubes. Hot sauce and Tabasco sauce. Premade or packaged marinades. Premade or packaged taco seasonings. Relishes. Regular salad dressings.  Where to find more information:  · National Heart, Lung, and Blood [place]: www.nhlbi.nih.gov  · American Heart Association: www.heart.org  Summary  · The DASH eating plan is a healthy eating plan that has been shown to reduce high blood pressure (hypertension). It may also reduce your risk for type 2 diabetes, heart disease, and stroke.  · With the DASH eating  plan, you should limit salt (sodium) intake to 2,300 mg a day. If you have hypertension, you may need to reduce your sodium intake to 1,500 mg a day.  · When on the DASH eating plan, aim to eat more fresh fruits and vegetables, whole grains, lean proteins, low-fat dairy, and heart-healthy fats.  · Work with your health care provider or diet and nutrition specialist (dietitian) to adjust your eating plan to your individual calorie needs.  This information is not intended to replace advice given to you by your health care provider. Make sure you discuss any questions you have with your health care provider.  Document Released: 12/06/2012 Document Revised: 11/30/2018 Document Reviewed: 12/11/2017  [employer] Patient Education © 2020 [employer]    Edema    Edema is an abnormal buildup of fluids in the body tissues and under the skin. Swelling of the legs, feet, and ankles is a common symptom that becomes more likely as you get older. Swelling is also common in looser tissues, like around the eyes. When the affected area is squeezed, the fluid may move out of that spot and leave a dent for a few moments. This dent is called pitting edema.  There are many possible causes of edema. Eating too much salt (sodium) and being on your feet or sitting for a long time can cause edema in your legs, feet, and ankles. Hot weather may make edema worse. Common causes of edema include:  · Heart failure.  · Liver or kidney disease.  · Weak leg blood vessels.  · Cancer.  · An injury.  · Pregnancy.  · Medicines.  · Being obese.  · Low protein levels in the blood.  Edema is usually painless. Your skin may look swollen or shiny.  Follow these instructions at home:  · Keep the affected body part raised (elevated) above the level of your heart when you are sitting or lying down.  · Do not sit still or stand for long periods of time.  · Do not wear tight clothing. Do not wear garters on your upper legs.  · Exercise your legs to get your  circulation going. This helps to move the fluid back into your blood vessels, and it may help the swelling go down.  · Wear elastic bandages or support stockings to reduce swelling as told by your health care provider.  · Eat a low-salt (low-sodium) diet to reduce fluid as told by your health care provider.  · Depending on the cause of your swelling, you may need to limit how much fluid you drink (fluid restriction).  · Take over-the-counter and prescription medicines only as told by your health care provider.  Contact a health care provider if:  · Your edema does not get better with treatment.  · You have heart, liver, or kidney disease and have symptoms of edema.  · You have sudden and unexplained weight gain.  Get help right away if:  · You develop shortness of breath or chest pain.  · You cannot breathe when you lie down.  · You develop pain, redness, or warmth in the swollen areas.  · You have heart, liver, or kidney disease and suddenly get edema.  · You have a fever and your symptoms suddenly get worse.  Summary  · Edema is an abnormal buildup of fluids in the body tissues and under the skin.  · Eating too much salt (sodium) and being on your feet or sitting for a long time can cause edema in your legs, feet, and ankles.  · Keep the affected body part raised (elevated) above the level of your heart when you are sitting or lying down.  This information is not intended to replace advice given to you by your health care provider. Make sure you discuss any questions you have with your health care provider.  Document Released: 12/18/2006 Document Revised: 05/06/2020 Document Reviewed: 01/20/2018  Elsevier Patient Education © 2020 [employer]

## 2020-10-27 ENCOUNTER — OFFICE VISIT (OUTPATIENT)
Dept: FAMILY MEDICINE CLINIC | Facility: CLINIC | Age: 75
End: 2020-10-27

## 2020-10-27 VITALS
SYSTOLIC BLOOD PRESSURE: 134 MMHG | WEIGHT: 206 LBS | HEART RATE: 73 BPM | OXYGEN SATURATION: 98 % | HEIGHT: 66 IN | TEMPERATURE: 96.9 F | BODY MASS INDEX: 33.11 KG/M2 | DIASTOLIC BLOOD PRESSURE: 72 MMHG

## 2020-10-27 DIAGNOSIS — I10 ESSENTIAL HYPERTENSION: ICD-10-CM

## 2020-10-27 DIAGNOSIS — R73.9 HYPERGLYCEMIA: ICD-10-CM

## 2020-10-27 DIAGNOSIS — E55.9 VITAMIN D DEFICIENCY: ICD-10-CM

## 2020-10-27 DIAGNOSIS — M79.10 MYALGIA: Primary | ICD-10-CM

## 2020-10-27 DIAGNOSIS — E78.2 MIXED HYPERLIPIDEMIA: ICD-10-CM

## 2020-10-27 DIAGNOSIS — N18.30 CHRONIC RENAL INSUFFICIENCY, STAGE III (MODERATE) (HCC): ICD-10-CM

## 2020-10-27 DIAGNOSIS — N18.32 CHRONIC RENAL IMPAIRMENT, STAGE 3B (HCC): ICD-10-CM

## 2020-10-27 DIAGNOSIS — Z78.9 STATIN INTOLERANCE: ICD-10-CM

## 2020-10-27 PROCEDURE — 99214 OFFICE O/P EST MOD 30 MIN: CPT | Performed by: FAMILY MEDICINE

## 2020-10-27 NOTE — PROGRESS NOTES
Chief Complaint   Patient presents with   • Back Pain       Subjective     Anjana Martínez is a 75 y.o. female.   She presents today with complaints of low back painfor about a week, patient also states her knees are also hurting for the past week.  She reports it is difficult to raise her right leg at her hip, is somewhat painful and weak.  She has not had x-rays of back hips or knees.  She denies history of back problems or degenerative disc disease.  She denies problems with arthritis.  She does report knees will pop and crack at times when she is crouching or stooping.    Have had muscle and back pain in past with different statins simvastain, and atorvastain. Do not remember problem with pravachol, likely changed due to potency.   Did skip crestor 1 day,  following day was some better but did not want to assume was the statin again.   Tylenol is somewhat helpful.    The following portions of the patient's history were reviewed and updated as appropriate: allergies, current medications, past family history, past medical history, past social history, past surgical history and problem list.    Current Outpatient Medications on File Prior to Visit   Medication Sig   • Cholecalciferol (CVS D3) 50 MCG (2000 UT) capsule CVS D3 2000 UNIT CAPS   • fluticasone (FLONASE) 50 MCG/ACT nasal spray 2 sprays into the nostril(s) as directed by provider Daily.   • lisinopril (PRINIVIL,ZESTRIL) 10 MG tablet Take 1 tablet by mouth Daily.   • metoprolol succinate XL (TOPROL-XL) 25 MG 24 hr tablet Take 25 mg by mouth Daily.   • [DISCONTINUED] rosuvastatin (CRESTOR) 5 MG tablet Take 1 tablet by mouth Daily.     No current facility-administered medications on file prior to visit.      Medications Discontinued During This Encounter   Medication Reason   • rosuvastatin (CRESTOR) 5 MG tablet Side effects       Review of Systems   Constitutional: Negative for fatigue and fever.   Respiratory: Negative for cough and shortness of breath.   "  Cardiovascular: Negative for chest pain, palpitations and leg swelling.   Gastrointestinal: Negative.    Musculoskeletal: Positive for arthralgias and back pain.   Skin: Negative for rash.   Neurological: Positive for weakness.   Psychiatric/Behavioral: Negative for depressed mood.        Objective   Vitals:    10/27/20 1129   BP: 134/72   BP Location: Right arm   Patient Position: Sitting   Cuff Size: Adult   Pulse: 73   Temp: 96.9 °F (36.1 °C)   TempSrc: Infrared   SpO2: 98%   Weight: 93.4 kg (206 lb)   Height: 167.6 cm (65.98\")      Body mass index is 33.27 kg/m².    Physical Exam  Vitals signs and nursing note reviewed.   Constitutional:       General: She is not in acute distress.     Appearance: She is well-developed.   HENT:      Head: Normocephalic and atraumatic.   Cardiovascular:      Rate and Rhythm: Normal rate and regular rhythm.      Heart sounds: No murmur.   Pulmonary:      Effort: Pulmonary effort is normal.      Breath sounds: Normal breath sounds. No wheezing.   Abdominal:      General: Bowel sounds are normal.      Tenderness: There is no right CVA tenderness or left CVA tenderness.   Musculoskeletal: Normal range of motion.      Comments: There is minimal tenderness to palpation in the midline lumbar spine, moderate tenderness in the paraspinous muscles and SI joint area, no tenderness through sciatic region of buttock.  There is limited active forward flexion of right hip due to pain in upper leg and back.  Knee joints somewhat enlarged, possibly with arthritic thickening, minimal crepitus in knees bilaterally, no increased warmth, no effusion   Skin:     General: Skin is warm and dry.      Findings: No rash.   Neurological:      Mental Status: She is alert and oriented to person, place, and time.         Lab Results   Component Value Date     (H) 09/22/2020    BUN 29 (H) 09/22/2020    CREATININE 1.37 (H) 09/22/2020    EGFRIFNONA 38 (L) 09/22/2020    EGFRIFAFRI 44 (L) 09/22/2020    NA " 143 09/22/2020    K 4.5 09/22/2020     (H) 09/22/2020    CALCIUM 9.5 09/22/2020    ALBUMIN 4.3 09/22/2020    BILITOT 0.5 09/22/2020    ALKPHOS 79 09/22/2020    AST 14 09/22/2020    ALT 16 09/22/2020      Lab Results   Component Value Date    HGBA1C 5.8 (H) 06/17/2020    HGBA1C 5.8 (H) 02/28/2020    HGBA1C 5.8 (H) 11/25/2019        Procedures     Assessment/Plan   Diagnoses and all orders for this visit:    1. Myalgia (Primary)  -     CK    2. Statin intolerance    3. Chronic renal impairment, stage 3b    4. Hyperglycemia    5. Essential hypertension    6. Mixed hyperlipidemia    7. Vitamin D deficiency    Recent onset low back and knee pain with reduced range of motion of right hip possibly consistent with side effect of statin.  Stopping Crestor.  Checking labs as previously ordered (A1c, lipid panel, CMP, vitamin D, and microalbumin), plus creatinine kinase.  Holding Crestor for now if symptoms resolve would consider restarting Pravachol.  If symptoms do not improve may need x-rays of back knees and possibly hip.  Medications Discontinued During This Encounter   Medication Reason   • rosuvastatin (CRESTOR) 5 MG tablet Side effects          Return in about 2 weeks (around 11/10/2020) for Recheck myalgias and needs Medicare wellness.    Education reference material relevant to visit available in AVS through [employer] or printed copy given.

## 2020-10-28 LAB
25(OH)D3+25(OH)D2 SERPL-MCNC: 41.4 NG/ML (ref 30–100)
ALBUMIN SERPL-MCNC: 4.6 G/DL (ref 3.7–4.7)
ALBUMIN/GLOB SERPL: 1.7 {RATIO} (ref 1.2–2.2)
ALP SERPL-CCNC: 78 IU/L (ref 39–117)
ALT SERPL-CCNC: 17 IU/L (ref 0–32)
AST SERPL-CCNC: 12 IU/L (ref 0–40)
BILIRUB SERPL-MCNC: 0.4 MG/DL (ref 0–1.2)
BUN SERPL-MCNC: 26 MG/DL (ref 8–27)
BUN/CREAT SERPL: 22 (ref 12–28)
CALCIUM SERPL-MCNC: 10.3 MG/DL (ref 8.7–10.3)
CHLORIDE SERPL-SCNC: 105 MMOL/L (ref 96–106)
CHOLEST SERPL-MCNC: 174 MG/DL (ref 100–199)
CK SERPL-CCNC: 141 U/L (ref 32–182)
CO2 SERPL-SCNC: 25 MMOL/L (ref 20–29)
CREAT SERPL-MCNC: 1.16 MG/DL (ref 0.57–1)
GLOBULIN SER CALC-MCNC: 2.7 G/DL (ref 1.5–4.5)
GLUCOSE SERPL-MCNC: 89 MG/DL (ref 65–99)
HBA1C MFR BLD: 6 % (ref 4.8–5.6)
HDLC SERPL-MCNC: 37 MG/DL
LDLC SERPL CALC-MCNC: 79 MG/DL (ref 0–99)
MICROALBUMIN UR-MCNC: 10.1 UG/ML
POTASSIUM SERPL-SCNC: 4.8 MMOL/L (ref 3.5–5.2)
PROT SERPL-MCNC: 7.3 G/DL (ref 6–8.5)
SODIUM SERPL-SCNC: 143 MMOL/L (ref 134–144)
TRIGL SERPL-MCNC: 359 MG/DL (ref 0–149)
VLDLC SERPL CALC-MCNC: 58 MG/DL (ref 5–40)

## 2020-11-16 ENCOUNTER — OFFICE VISIT (OUTPATIENT)
Dept: FAMILY MEDICINE CLINIC | Facility: CLINIC | Age: 75
End: 2020-11-16

## 2020-11-16 VITALS
OXYGEN SATURATION: 99 % | DIASTOLIC BLOOD PRESSURE: 68 MMHG | BODY MASS INDEX: 33.11 KG/M2 | SYSTOLIC BLOOD PRESSURE: 123 MMHG | HEART RATE: 71 BPM | HEIGHT: 66 IN | TEMPERATURE: 96.8 F | WEIGHT: 206 LBS

## 2020-11-16 DIAGNOSIS — R73.02 IGT (IMPAIRED GLUCOSE TOLERANCE): ICD-10-CM

## 2020-11-16 DIAGNOSIS — I10 ESSENTIAL HYPERTENSION: ICD-10-CM

## 2020-11-16 DIAGNOSIS — M54.50 CHRONIC MIDLINE LOW BACK PAIN, UNSPECIFIED WHETHER SCIATICA PRESENT: ICD-10-CM

## 2020-11-16 DIAGNOSIS — E66.09 CLASS 1 OBESITY DUE TO EXCESS CALORIES WITHOUT SERIOUS COMORBIDITY WITH BODY MASS INDEX (BMI) OF 33.0 TO 33.9 IN ADULT: ICD-10-CM

## 2020-11-16 DIAGNOSIS — E78.2 MIXED HYPERLIPIDEMIA: ICD-10-CM

## 2020-11-16 DIAGNOSIS — N18.32 CHRONIC RENAL IMPAIRMENT, STAGE 3B (HCC): ICD-10-CM

## 2020-11-16 DIAGNOSIS — G89.29 CHRONIC MIDLINE LOW BACK PAIN, UNSPECIFIED WHETHER SCIATICA PRESENT: ICD-10-CM

## 2020-11-16 DIAGNOSIS — M79.10 MYALGIA: Primary | ICD-10-CM

## 2020-11-16 PROCEDURE — 99214 OFFICE O/P EST MOD 30 MIN: CPT | Performed by: FAMILY MEDICINE

## 2020-12-09 ENCOUNTER — TRANSCRIBE ORDERS (OUTPATIENT)
Dept: ADMINISTRATIVE | Facility: HOSPITAL | Age: 75
End: 2020-12-09

## 2020-12-09 DIAGNOSIS — Z12.39 SCREENING BREAST EXAMINATION: Primary | ICD-10-CM

## 2020-12-16 ENCOUNTER — HOSPITAL ENCOUNTER (OUTPATIENT)
Dept: MAMMOGRAPHY | Facility: HOSPITAL | Age: 75
Discharge: HOME | End: 2020-12-16
Admitting: FAMILY MEDICINE

## 2020-12-16 DIAGNOSIS — Z12.39 SCREENING BREAST EXAMINATION: ICD-10-CM

## 2020-12-16 PROCEDURE — 77067 SCR MAMMO BI INCL CAD: CPT

## 2020-12-16 PROCEDURE — 77063 BREAST TOMOSYNTHESIS BI: CPT

## 2021-02-15 NOTE — PROGRESS NOTES
Chief Complaint  Hypertension and Hyperlipidemia    Subjective          You have chosen to receive care through a telehealth visit.  Do you consent to use a video/audio connection for your medical care today? Yes    Unable to complete visit using a video connection to the patient. A phone visit was used to complete this visits. Total time of discussion was 18 minutes.      History of Present Illness  Anjana Martínez presents to [facility] for follow-up on hypertension, impaired glucose tolerance, hyperlipidemia, and renal insufficiency.    Hypertension, she denies problems with metoprolol or lisinopril. BP today was good as below in vitals.    Hyperlipidemia, previously discontinued Crestor due to myalgias.  Myalgias have improved however cholesterol elevated as below.  Patient has taken Pravachol in the past without side effects.    Patient states her blood pressure is running well and she is taking her medications as prescribed. Patient states the myalgia has went away since the discontinue of the statins.     1st covid vaccination jan 22, 2nd schedule feb 23 rd.     Follow up with [facility] in [place] March 9th Need to adjust pacemaker speed at that appt. states had to postpone visit has been difficult getting to [place].  [place]    Current Outpatient Medications on File Prior to Visit   Medication Sig   • Cholecalciferol (CVS D3) 50 MCG (2000 UT) capsule CVS D3 2000 UNIT CAPS   • fluticasone (FLONASE) 50 MCG/ACT nasal spray 2 sprays into the nostril(s) as directed by provider Daily.   • lisinopril (PRINIVIL,ZESTRIL) 10 MG tablet Take 1 tablet by mouth Daily.   • metoprolol succinate XL (TOPROL-XL) 25 MG 24 hr tablet Take 25 mg by mouth Daily.     No current facility-administered medications on file prior to visit.        Objective   Vital Signs:   /77 (BP Location: Left arm, Patient Position: Sitting, Cuff Size: Adult)    "Ht 167.6 cm (65.98\")   Wt 92.5 kg (204 lb)   BMI 32.95 kg/m²     Physical Exam  Vitals signs and nursing note reviewed.   Neurological:      Mental Status: She is alert and oriented to person, place, and time.   Psychiatric:         Thought Content: Thought content normal.          Lab Results   Component Value Date     (H) 02/12/2021    BUN 21 02/12/2021    CREATININE 1.35 (H) 02/12/2021    EGFRIFNONA 39 (L) 02/12/2021    EGFRIFAFRI 45 (L) 02/12/2021     02/12/2021    K 4.5 02/12/2021     02/12/2021    CALCIUM 9.9 02/12/2021    ALBUMIN 4.5 02/12/2021    BILITOT 0.6 02/12/2021    ALKPHOS 78 02/12/2021    AST 15 02/12/2021    ALT 16 02/12/2021    CHLPL 262 (H) 02/12/2021    TRIG 285 (H) 02/12/2021    HDL 37 (L) 02/12/2021    VLDL 54 (H) 02/12/2021     (H) 02/12/2021        Lab Results   Component Value Date    HGBA1C 6.0 (H) 02/12/2021    HGBA1C 6.0 (H) 10/27/2020    HGBA1C 5.8 (H) 06/17/2020    HGBA1C 5.8 (H) 02/28/2020       Result Review :                       Assessment and Plan    Diagnoses and all orders for this visit:    1. IGT (impaired glucose tolerance) (Primary)  -     Hemoglobin A1c; Future    2. Essential hypertension    3. Mixed hyperlipidemia  -     pravastatin (Pravachol) 40 MG tablet; Take 1 tablet by mouth Daily.  Dispense: 90 tablet; Refill: 3  -     Lipid Panel; Future    4. Chronic renal impairment, stage 3b (CMS/HCC)  -     Comprehensive Metabolic Panel; Future    5. Paroxysmal atrial tachycardia (CMS/HCC)  -     T4, Free; Future  -     TSH; Future    6. Presence of cardiac pacemaker      A1c has remained stable at 6.0.  Encouraged continued low concentrated sweets diet and increase physical activity.  Cholesterol significantly increased since Crestor was discontinued due to myalgias.  She has previously tolerated Pravachol.  Renal insufficiency, stable over the past 3 years.  Reminded not to take any anti-inflammatories, may take Tylenol as needed pain.  Did advise " Pravachol could potentially be harmful to the kidneys and will need to repeat labs in 3 months prior to follow-up  Did discuss possibly referring to cardiologist to come here to Dr. Ziggy Bhardwaj or Dr. Caicedo.  Patient intends to keep her pacemaker check in March and will consider that for future follow-up.  There are no discontinued medications.      Follow Up     Return in about 3 months (around 5/16/2021) for Recheck, htn, cholesterol impaired glucose tolerance, labs prior.    Patient was given instructions and counseling regarding her condition or for health maintenance advice. Please see specific information pulled into the AVS if appropriate.

## 2021-02-16 ENCOUNTER — TELEMEDICINE (OUTPATIENT)
Dept: FAMILY MEDICINE CLINIC | Facility: CLINIC | Age: 76
End: 2021-02-16

## 2021-02-16 VITALS
HEIGHT: 66 IN | WEIGHT: 204 LBS | DIASTOLIC BLOOD PRESSURE: 77 MMHG | SYSTOLIC BLOOD PRESSURE: 131 MMHG | BODY MASS INDEX: 32.78 KG/M2

## 2021-02-16 DIAGNOSIS — I47.1 PAROXYSMAL ATRIAL TACHYCARDIA (HCC): ICD-10-CM

## 2021-02-16 DIAGNOSIS — N18.32 CHRONIC RENAL IMPAIRMENT, STAGE 3B (HCC): ICD-10-CM

## 2021-02-16 DIAGNOSIS — I10 ESSENTIAL HYPERTENSION: ICD-10-CM

## 2021-02-16 DIAGNOSIS — Z95.0 PRESENCE OF CARDIAC PACEMAKER: ICD-10-CM

## 2021-02-16 DIAGNOSIS — R73.02 IGT (IMPAIRED GLUCOSE TOLERANCE): Primary | ICD-10-CM

## 2021-02-16 DIAGNOSIS — E78.2 MIXED HYPERLIPIDEMIA: ICD-10-CM

## 2021-02-16 PROCEDURE — 99214 OFFICE O/P EST MOD 30 MIN: CPT | Performed by: FAMILY MEDICINE

## 2021-02-16 RX ORDER — PRAVASTATIN SODIUM 40 MG
40 TABLET ORAL DAILY
Qty: 90 TABLET | Refills: 3 | Status: SHIPPED | OUTPATIENT
Start: 2021-02-16 | End: 2021-06-02

## 2021-03-03 ENCOUNTER — TELEPHONE (OUTPATIENT)
Dept: FAMILY MEDICINE CLINIC | Facility: CLINIC | Age: 76
End: 2021-03-03

## 2021-03-03 NOTE — TELEPHONE ENCOUNTER
Caller: Anjana Martínez    Relationship to patient: Self    Best call back number: [phone]    Patient is needing:patient is having reaction to pravastatin (Pravachol) 40 MG tablet.   Patient states that she feels very weak and has a lot of pain in her knees.  Patient has stopped taking the medication as of 3-2-21. Patient states that she feels better today.    Please call patient and advise

## 2021-03-04 NOTE — TELEPHONE ENCOUNTER
Please inform patient that she had previously reported myalgias with Crestor.she told me she is taking Pravachol without any side effects.  Regardless she should not restart the Pravachol especially since she is starting to feel better so quickly without it.  Please make sure she is not having any signs of significant distress i.e. increasing weakness or severe muscle pain.  Tell her to concentrate low cholesterol diet and we will repeat cholesterol in 3 months.

## 2021-03-04 NOTE — TELEPHONE ENCOUNTER
Spoke with patient, she is feeling better, no pain now and no weakness, will concentrate on a low cholesterol diet

## 2021-05-12 DIAGNOSIS — N18.30 CHRONIC RENAL INSUFFICIENCY, STAGE III (MODERATE) (HCC): ICD-10-CM

## 2021-05-12 DIAGNOSIS — I10 ESSENTIAL HYPERTENSION: ICD-10-CM

## 2021-05-12 LAB
ALBUMIN SERPL-MCNC: 4.2 G/DL (ref 3.7–4.7)
ALBUMIN/GLOB SERPL: 1.5 {RATIO} (ref 1.2–2.2)
ALP SERPL-CCNC: 74 IU/L (ref 39–117)
ALT SERPL-CCNC: 16 IU/L (ref 0–32)
AST SERPL-CCNC: 13 IU/L (ref 0–40)
BILIRUB SERPL-MCNC: 0.5 MG/DL (ref 0–1.2)
BUN SERPL-MCNC: 22 MG/DL (ref 8–27)
BUN/CREAT SERPL: 16 (ref 12–28)
CALCIUM SERPL-MCNC: 9.8 MG/DL (ref 8.7–10.3)
CHLORIDE SERPL-SCNC: 105 MMOL/L (ref 96–106)
CHOLEST SERPL-MCNC: 193 MG/DL (ref 100–199)
CO2 SERPL-SCNC: 24 MMOL/L (ref 20–29)
CREAT SERPL-MCNC: 1.36 MG/DL (ref 0.57–1)
GLOBULIN SER CALC-MCNC: 2.8 G/DL (ref 1.5–4.5)
GLUCOSE SERPL-MCNC: 98 MG/DL (ref 65–99)
HBA1C MFR BLD: 5.9 % (ref 4.8–5.6)
HDLC SERPL-MCNC: 41 MG/DL
LDLC SERPL CALC-MCNC: 114 MG/DL (ref 0–99)
POTASSIUM SERPL-SCNC: 4.3 MMOL/L (ref 3.5–5.2)
PROT SERPL-MCNC: 7 G/DL (ref 6–8.5)
SODIUM SERPL-SCNC: 143 MMOL/L (ref 134–144)
T4 FREE SERPL-MCNC: 0.96 NG/DL (ref 0.82–1.77)
TRIGL SERPL-MCNC: 220 MG/DL (ref 0–149)
TSH SERPL DL<=0.005 MIU/L-ACNC: 4.42 UIU/ML (ref 0.45–4.5)
VLDLC SERPL CALC-MCNC: 38 MG/DL (ref 5–40)

## 2021-05-12 RX ORDER — LISINOPRIL 10 MG/1
TABLET ORAL
Qty: 90 TABLET | Refills: 3 | Status: SHIPPED | OUTPATIENT
Start: 2021-05-12 | End: 2022-03-09

## 2021-06-02 ENCOUNTER — OFFICE VISIT (OUTPATIENT)
Dept: FAMILY MEDICINE CLINIC | Facility: CLINIC | Age: 76
End: 2021-06-02

## 2021-06-02 VITALS
HEIGHT: 66 IN | SYSTOLIC BLOOD PRESSURE: 123 MMHG | TEMPERATURE: 98.6 F | OXYGEN SATURATION: 97 % | DIASTOLIC BLOOD PRESSURE: 67 MMHG | WEIGHT: 205 LBS | RESPIRATION RATE: 16 BRPM | BODY MASS INDEX: 32.95 KG/M2 | HEART RATE: 73 BPM

## 2021-06-02 DIAGNOSIS — N18.32 CHRONIC RENAL IMPAIRMENT, STAGE 3B (HCC): Primary | ICD-10-CM

## 2021-06-02 DIAGNOSIS — R42 POSTURAL DIZZINESS: ICD-10-CM

## 2021-06-02 DIAGNOSIS — E55.9 VITAMIN D DEFICIENCY: ICD-10-CM

## 2021-06-02 DIAGNOSIS — E78.2 MIXED HYPERLIPIDEMIA: ICD-10-CM

## 2021-06-02 DIAGNOSIS — I10 ESSENTIAL HYPERTENSION: ICD-10-CM

## 2021-06-02 PROCEDURE — 99214 OFFICE O/P EST MOD 30 MIN: CPT | Performed by: FAMILY MEDICINE

## 2021-06-02 RX ORDER — PRAVASTATIN SODIUM 20 MG
20 TABLET ORAL NIGHTLY
Qty: 90 TABLET | Refills: 3
Start: 2021-06-02 | End: 2021-09-06 | Stop reason: SDUPTHER

## 2021-06-02 NOTE — PROGRESS NOTES
"Chief Complaint  Hypertension and Hyperlipidemia    History of Present Illness  Anjana Martínez presents today for follow-up on hypertension and hyperlipidemia.     Patient states she does not check her pressures at home. Patient states she is feeling well, no chest pain, no shortness of breath    Patient had labs last month and months to discuss the results.     Patient is only taking half pravastatin 40 mg (now taking 20 mg) . Patient states she is doing well with it.,  No significant myalgias.    Patient reports new concern of getting dizzy when she stands up especially after longer car rides.  Occasionally happens at home as well but will have to be sitting for at least 30 to 45 minutes before she notices.  She will feel unsteady for a few minutes and then the feeling passes.  She denies any chest pain, palpitations, fast heart rate, or shortness of breath.    Subjective            Current Outpatient Medications on File Prior to Visit   Medication Sig   • Cholecalciferol (CVS D3) 50 MCG (2000 UT) capsule CVS D3 2000 UNIT CAPS   • lisinopril (PRINIVIL,ZESTRIL) 10 MG tablet TAKE 1 TABLET EVERY DAY   • metoprolol succinate XL (TOPROL-XL) 25 MG 24 hr tablet Take 25 mg by mouth Daily.   • fluticasone (FLONASE) 50 MCG/ACT nasal spray 2 sprays into the nostril(s) as directed by provider Daily.     No current facility-administered medications on file prior to visit.       Objective   Vital Signs:   /67 (BP Location: Left arm, Patient Position: Sitting, Cuff Size: Large Adult)   Pulse 73   Temp 98.6 °F (37 °C) (Infrared)   Resp 16   Ht 167.6 cm (65.98\")   Wt 93 kg (205 lb)   SpO2 97%   BMI 33.11 kg/m²     Physical Exam  Vitals and nursing note reviewed.   Constitutional:       General: She is not in acute distress.     Appearance: She is well-developed. She is obese.   HENT:      Head: Normocephalic and atraumatic.   Neck:      Vascular: No carotid bruit.   Cardiovascular:      Rate and Rhythm: Normal rate " and regular rhythm.      Heart sounds: No murmur heard.     Pulmonary:      Effort: Pulmonary effort is normal.      Breath sounds: Normal breath sounds. No wheezing.   Musculoskeletal:         General: Normal range of motion.      Cervical back: Normal range of motion. No rigidity or tenderness.   Lymphadenopathy:      Cervical: No cervical adenopathy.   Skin:     General: Skin is warm and dry.      Findings: No rash.   Neurological:      Mental Status: She is alert and oriented to person, place, and time.   Psychiatric:         Mood and Affect: Mood normal.         Thought Content: Thought content normal.              Lab Results   Component Value Date    HGBA1C 5.9 (H) 05/11/2021    HGBA1C 6.0 (H) 02/12/2021    HGBA1C 6.0 (H) 10/27/2020    HGBA1C 5.8 (H) 06/17/2020     No visits with results within 3 Month(s) from this visit.   Latest known visit with results is:   Telemedicine on 02/16/2021   Component Date Value Ref Range Status   • TSH 05/11/2021 4.420  0.450 - 4.500 uIU/mL Final   • Free T4 05/11/2021 0.96  0.82 - 1.77 ng/dL Final   • Total Cholesterol 05/11/2021 193  100 - 199 mg/dL Final   • Triglycerides 05/11/2021 220* 0 - 149 mg/dL Final   • HDL Cholesterol 05/11/2021 41  >39 mg/dL Final   • VLDL Cholesterol Dario 05/11/2021 38  5 - 40 mg/dL Final   • LDL Chol Calc ([facility]) 05/11/2021 114* 0 - 99 mg/dL Final   • Hemoglobin A1C 05/11/2021 5.9* 4.8 - 5.6 % Final    Comment:          Prediabetes: 5.7 - 6.4           Diabetes: >6.4           Glycemic control for adults with diabetes: <7.0     • Glucose 05/11/2021 98  65 - 99 mg/dL Final   • BUN 05/11/2021 22  8 - 27 mg/dL Final   • Creatinine 05/11/2021 1.36* 0.57 - 1.00 mg/dL Final   • eGFR Non  Am 05/11/2021 38* >59 mL/min/1.73 Final   • eGFR African Am 05/11/2021 44* >59 mL/min/1.73 Final    Comment: **[facility] currently reports eGFR in compliance with the current**    recommendations of the [employer]. [facility] will    update reporting as  new guidelines are published from the NKF-ASN    Task force.     • BUN/Creatinine Ratio 05/11/2021 16  12 - 28 Final   • Sodium 05/11/2021 143  134 - 144 mmol/L Final   • Potassium 05/11/2021 4.3  3.5 - 5.2 mmol/L Final   • Chloride 05/11/2021 105  96 - 106 mmol/L Final   • Total CO2 05/11/2021 24  20 - 29 mmol/L Final   • Calcium 05/11/2021 9.8  8.7 - 10.3 mg/dL Final   • Total Protein 05/11/2021 7.0  6.0 - 8.5 g/dL Final   • Albumin 05/11/2021 4.2  3.7 - 4.7 g/dL Final   • Globulin 05/11/2021 2.8  1.5 - 4.5 g/dL Final   • A/G Ratio 05/11/2021 1.5  1.2 - 2.2 Final   • Total Bilirubin 05/11/2021 0.5  0.0 - 1.2 mg/dL Final   • Alkaline Phosphatase 05/11/2021 74  39 - 117 IU/L Final   • AST (SGOT) 05/11/2021 13  0 - 40 IU/L Final   • ALT (SGPT) 05/11/2021 16  0 - 32 IU/L Final                  Assessment and Plan    Diagnoses and all orders for this visit:    1. Chronic renal impairment, stage 3b (CMS/HCC) (Primary)    2. Essential hypertension  -     Comprehensive Metabolic Panel; Future    3. Mixed hyperlipidemia  -     pravastatin (PRAVACHOL) 20 MG tablet; Take 1 tablet by mouth Every Night.  Dispense: 90 tablet; Refill: 3  -     Lipid Panel; Future    4. Vitamin D deficiency  -     Vitamin D 25 Hydroxy; Future    5. Postural dizziness      Hyperlipidemia, above labs above goal however patient had just started Pravachol. She started taking 1/2 tablet approximately 1 week prior to above labs. She had skipped taking it for a while due to myalgias in back knees and legs, Myalgias are better on lower dose.  She has previously had myalgias on simvastatin, rosuvastatin and atorvastatin.  We will continue 20 mg Pravachol daily, recommend low cholesterol diet, increase physical activity, recheck labs in 3 months.  Postural dizziness possible orthostatic hypotension, or mild dehydration.  Do recommend increasing fluids, stay fully sure she is not dizzy before she starts walking..    Medications Discontinued During This  Encounter   Medication Reason   • pravastatin (Pravachol) 40 MG tablet          Follow Up     Return in about 3 months (around 9/2/2021) for Annual physical, cholesterol, htn.    Patient was given instructions and counseling regarding her condition or for health maintenance advice. Please see specific information pulled into the AVS if appropriate.

## 2021-06-02 NOTE — PATIENT INSTRUCTIONS
Dizziness  Dizziness is a common problem. It is a feeling of unsteadiness or light-headedness. You may feel like you are about to faint. Dizziness can lead to injury if you stumble or fall. Anyone can become dizzy, but dizziness is more common in older adults. This condition can be caused by a number of things, including medicines, dehydration, or illness.  Follow these instructions at home:  Eating and drinking  · Drink enough fluid to keep your urine clear or pale yellow. This helps to keep you from becoming dehydrated. Try to drink more clear fluids, such as water.  · Do not drink alcohol.  · Limit your caffeine intake if told to do so by your health care provider. Check ingredients and nutrition facts to see if a food or beverage contains caffeine.  · Limit your salt (sodium) intake if told to do so by your health care provider. Check ingredients and nutrition facts to see if a food or beverage contains sodium.  Activity  · Avoid making quick movements.  ? Rise slowly from chairs and steady yourself until you feel okay.  ? In the morning, first sit up on the side of the bed. When you feel okay, stand slowly while you hold onto something until you know that your balance is fine.  · If you need to  one place for a long time, move your legs often. Tighten and relax the muscles in your legs while you are standing.  · Do not drive or use heavy machinery if you feel dizzy.  · Avoid bending down if you feel dizzy. Place items in your home so that they are easy for you to reach without leaning over.  Lifestyle  · Do not use any products that contain nicotine or tobacco, such as cigarettes and e-cigarettes. If you need help quitting, ask your health care provider.  · Try to reduce your stress level by using methods such as yoga or meditation. Talk with your health care provider if you need help to manage your stress.  General instructions  · Watch your dizziness for any changes.  · Take over-the-counter and  prescription medicines only as told by your health care provider. Talk with your health care provider if you think that your dizziness is caused by a medicine that you are taking.  · Tell a friend or a family member that you are feeling dizzy. If he or she notices any changes in your behavior, have this person call your health care provider.  · Keep all follow-up visits as told by your health care provider. This is important.  Contact a health care provider if:  · Your dizziness does not go away.  · Your dizziness or light-headedness gets worse.  · You feel nauseous.  · You have reduced hearing.  · You have new symptoms.  · You are unsteady on your feet or you feel like the room is spinning.  Get help right away if:  · You vomit or have diarrhea and are unable to eat or drink anything.  · You have problems talking, walking, swallowing, or using your arms, hands, or legs.  · You feel generally weak.  · You are not thinking clearly or you have trouble forming sentences. It may take a friend or family member to notice this.  · You have chest pain, abdominal pain, shortness of breath, or sweating.  · Your vision changes.  · You have any bleeding.  · You have a severe headache.  · You have neck pain or a stiff neck.  · You have a fever.  These symptoms may represent a serious problem that is an emergency. Do not wait to see if the symptoms will go away. Get medical help right away. Call your local emergency services (911 in the [place] Do not drive yourself to the hospital.  Summary  · Dizziness is a feeling of unsteadiness or light-headedness. This condition can be caused by a number of things, including medicines, dehydration, or illness.  · Anyone can become dizzy, but dizziness is more common in older adults.  · Drink enough fluid to keep your urine clear or pale yellow. Do not drink alcohol.  · Avoid making quick movements if you feel dizzy. Monitor your dizziness for any changes.  This information is not intended to  replace advice given to you by your health care provider. Make sure you discuss any questions you have with your health care provider.  Document Revised: 12/21/2018 Document Reviewed: 01/20/2018  [employer]vier Patient Education © 2021 [employer]    Orthostatic Hypotension  Blood pressure is a measurement of how strongly, or weakly, your blood is pressing against the walls of your arteries. Orthostatic hypotension is a sudden drop in blood pressure that happens when you quickly change positions, such as when you get up from sitting or lying down.  Arteries are blood vessels that carry blood from your heart throughout your body. When blood pressure is too low, you may not get enough blood to your brain or to the rest of your organs. This can cause weakness, light-headedness, rapid heartbeat, and fainting. This can last for just a few seconds or for up to a few minutes. Orthostatic hypotension is usually not a serious problem. However, if it happens frequently or gets worse, it may be a sign of something more serious.  What are the causes?  This condition may be caused by:  · Sudden changes in posture, such as standing up quickly after you have been sitting or lying down.  · Blood loss.  · Loss of body fluids (dehydration).  · Heart problems.  · Hormone (endocrine) problems.  · Pregnancy.  · Severe infection.  · Lack of certain nutrients.  · Severe allergic reactions (anaphylaxis).  · Certain medicines, such as blood pressure medicine or medicines that make the body lose excess fluids (diuretics). Sometimes, this condition can be caused by not taking medicine as directed, such as taking too much of a certain medicine.  What increases the risk?  The following factors may make you more likely to develop this condition:  · Age. Risk increases as you get older.  · Conditions that affect the heart or the central nervous system.  · Taking certain medicines, such as blood pressure medicine or diuretics.  · Being pregnant.  What  "are the signs or symptoms?  Symptoms of this condition may include:  · Weakness.  · Light-headedness.  · Dizziness.  · Blurred vision.  · Fatigue.  · Rapid heartbeat.  · Fainting, in severe cases.  How is this diagnosed?  This condition is diagnosed based on:  · Your medical history.  · Your symptoms.  · Your blood pressure measurement. Your health care provider will check your blood pressure when you are:  ? Lying down.  ? Sitting.  ? Standing.  A blood pressure reading is recorded as two numbers, such as \"120 over 80\" (or 120/80). The first (\"top\") number is called the systolic pressure. It is a measure of the pressure in your arteries as your heart beats. The second (\"bottom\") number is called the diastolic pressure. It is a measure of the pressure in your arteries when your heart relaxes between beats. Blood pressure is measured in a unit called mm Hg. Healthy blood pressure for most adults is 120/80. If your blood pressure is below 90/60, you may be diagnosed with hypotension.  Other information or tests that may be used to diagnose orthostatic hypotension include:  · Your other vital signs, such as your heart rate and temperature.  · Blood tests.  · Tilt table test. For this test, you will be safely secured to a table that moves you from a lying position to an upright position. Your heart rhythm and blood pressure will be monitored during the test.  How is this treated?  This condition may be treated by:  · Changing your diet. This may involve eating more salt (sodium) or drinking more water.  · Taking medicines to raise your blood pressure.  · Changing the dosage of certain medicines you are taking that might be lowering your blood pressure.  · Wearing compression stockings. These stockings help to prevent blood clots and reduce swelling in your legs.  In some cases, you may need to go to the hospital for:  · Fluid replacement. This means you will receive fluids through an IV.  · Blood replacement. This means " you will receive donated blood through an IV (transfusion).  · Treating an infection or heart problems, if this applies.  · Monitoring. You may need to be monitored while medicines that you are taking wear off.  Follow these instructions at home:  Eating and drinking    · Drink enough fluid to keep your urine pale yellow.  · Eat a healthy diet, and follow instructions from your health care provider about eating or drinking restrictions. A healthy diet includes:  ? Fresh fruits and vegetables.  ? Whole grains.  ? Lean meats.  ? Low-fat dairy products.  · Eat extra salt only as directed. Do not add extra salt to your diet unless your health care provider told you to do that.  · Eat frequent, small meals.  · Avoid standing up suddenly after eating.  Medicines  · Take over-the-counter and prescription medicines only as told by your health care provider.  ? Follow instructions from your health care provider about changing the dosage of your current medicines, if this applies.  ? Do not stop or adjust any of your medicines on your own.  General instructions    · Wear compression stockings as told by your health care provider.  · Get up slowly from lying down or sitting positions. This gives your blood pressure a chance to adjust.  · Avoid hot showers and excessive heat as directed by your health care provider.  · Return to your normal activities as told by your health care provider. Ask your health care provider what activities are safe for you.  · Do not use any products that contain nicotine or tobacco, such as cigarettes, e-cigarettes, and chewing tobacco. If you need help quitting, ask your health care provider.  · Keep all follow-up visits as told by your health care provider. This is important.  Contact a health care provider if you:  · Vomit.  · Have diarrhea.  · Have a fever for more than 2-3 days.  · Feel more thirsty than usual.  · Feel weak and tired.  Get help right away if you:  · Have chest pain.  · Have a  fast or irregular heartbeat.  · Develop numbness in any part of your body.  · Cannot move your arms or your legs.  · Have trouble speaking.  · Become sweaty or feel light-headed.  · Faint.  · Feel short of breath.  · Have trouble staying awake.  · Feel confused.  Summary  · Orthostatic hypotension is a sudden drop in blood pressure that happens when you quickly change positions.  · Orthostatic hypotension is usually not a serious problem.  · It is diagnosed by having your blood pressure taken lying down, sitting, and then standing.  · It may be treated by changing your diet or adjusting your medicines.  This information is not intended to replace advice given to you by your health care provider. Make sure you discuss any questions you have with your health care provider.  Document Revised: 06/13/2019 Document Reviewed: 06/13/2019  [employer]vier Patient Education © 2021 [employer]

## 2021-09-05 NOTE — PROGRESS NOTES
The ABCs of the Annual Wellness Visit  Subsequent Medicare Wellness Visit    Chief Complaint   Patient presents with   • Medicare Wellness-subsequent   • Chronic Kidney Disease   • Hypertension   • Hyperlipidemia      Subjective    History of Present Illness:  Anjana Martínez is a 76 y.o. female who presents for a Subsequent Medicare Wellness Visit.  As well as follow-up on hyperlipidemia, hypertension, vitamin D deficiency and to  review labs.    Chronic Kidney Disease  This is a chronic problem. The current episode started more than 1 year ago. Pertinent negatives include no chest pain or headaches. The treatment provided moderate relief.   Hypertension  This is a chronic problem. The current episode started more than 1 year ago. The problem is controlled. Pertinent negatives include no chest pain, headaches, orthopnea, palpitations, peripheral edema, PND, shortness of breath or sweats. Risk factors for coronary artery disease include dyslipidemia, obesity, post-menopausal state and family history. Current antihypertension treatment includes beta blockers. The current treatment provides moderate improvement. Hypertensive end-organ damage includes kidney disease. Identifiable causes of hypertension include chronic renal disease.   Hyperlipidemia  This is a chronic problem. The current episode started more than 1 year ago. The problem is uncontrolled. Recent lipid tests were reviewed and are high. Exacerbating diseases include chronic renal disease and obesity. Pertinent negatives include no chest pain or shortness of breath. Current antihyperlipidemic treatment includes statins. The current treatment provides moderate improvement of lipids. Risk factors for coronary artery disease include dyslipidemia, hypertension, post-menopausal, obesity and family history.       Hyperlipidemia, currently taking Pravachol 20 mg daily (restarted about 6 weeks ago).   Previously higher dose caused myalgias in back, knees and legs.  Myalgias have remained better on lower dose.  She has previously had myalgias on simvastatin, rosuvastatin and atorvastatin.  We will continue 20 mg Pravachol daily,    The following portions of the patient's history were reviewed and   updated as appropriate: allergies, current medications, past family history, past medical history, past social history, past surgical history and problem list.     Compared to one year ago, the patient feels her physical   health is the same.    Compared to one year ago, the patient feels her mental   health is the same.    Recent Hospitalizations:  She was not admitted to the hospital during the last year.       Current Medical Providers:  Patient Care Team:  Valeria Montes De Oca MD as PCP - General (Family Medicine)  EFRAIN Farr MD as Consulting Physician (Cardiology)    Outpatient Medications Prior to Visit   Medication Sig Dispense Refill   • Cholecalciferol ([facility]S D3) 50 MCG (2000 UT) capsule CVS D3 2000 UNIT CAPS     • lisinopril (PRINIVIL,ZESTRIL) 10 MG tablet TAKE 1 TABLET EVERY DAY 90 tablet 3   • metoprolol succinate XL (TOPROL-XL) 25 MG 24 hr tablet Take 25 mg by mouth Daily.     • pravastatin (PRAVACHOL) 20 MG tablet Take 1 tablet by mouth Every Night. 90 tablet 3   • fluticasone (FLONASE) 50 MCG/ACT nasal spray 2 sprays into the nostril(s) as directed by provider Daily. 1 bottle 3     No facility-administered medications prior to visit.       No opioid medication identified on active medication list. I have reviewed chart for other potential  high risk medication/s and harmful drug interactions in the elderly.          Aspirin is not on active medication list.  Aspirin use is not indicated based on review of current medical condition/s. Risk of harm outweighs potential benefits.  .    Patient Active Problem List   Diagnosis   • Complete heart block (CMS/HCC)   • AV block   • Bell's palsy   • Chronic renal insufficiency, stage III (moderate) (CMS/HCC)   •  "Degenerative arthritis   • Depression   • Fracture, radius, head   • Hyperglycemia   • Hypertension   • Morbid (severe) obesity due to excess calories (CMS/HCC)   • Osteopenia   • Paroxysmal atrial tachycardia (CMS/HCC)   • Pedal edema   • Plantar fasciitis   • Presence of cardiac pacemaker   • Tinea corporis   • Vitamin D deficiency   • Mixed hyperlipidemia   • Allergic rhinitis   • Dysfunction of both eustachian tubes   • IGT (impaired glucose tolerance)   • Postural dizziness     Advance Care Planning   Advance Directive is not on file.  ACP discussion was held with the patient during this visit. Patient does not have an advance directive, information provided.    Review of Systems   Respiratory: Negative for shortness of breath.    Cardiovascular: Negative for chest pain, palpitations, orthopnea and PND.        Objective       Vitals:    21 1251   BP: 132/82   BP Location: Left arm   Patient Position: Sitting   Cuff Size: Large Adult   Pulse: 90   Resp: 18   Temp: 97.8 °F (36.6 °C)   TempSrc: Temporal   SpO2: 98%  Comment: Room air   Weight: 94 kg (207 lb 3.2 oz)   Height: 167.6 cm (66\")   PainSc: 0-No pain     BMI Readings from Last 1 Encounters:   21 33.44 kg/m²   BMI is above normal parameters. Recommendations include: educational material, exercise counseling and nutrition counseling    Does the patient have evidence of cognitive impairment? No  ATTENTION  What is the year: correct  What is the month of the year: correct  What is the day of the week?: correct  What is the date?: correct  MEMORY  Repeat address three times, only score third attempt: [address] [place], [place]: 7  HOW MANY ANIMALS DID THE PATIENT NAME  Verbal Fluency -- Animal Names (0-25): 22+  CLOCK DRAWING  Clock Drawing: All Correct  MEMORY RECALL  Tell me what you remember about that name and address we were repeating at the beginnin  ACE TOTAL SCORE  Total ACE Score - <25/30 strongly suggests " cognitive impairment; <21/30 almost certainly shows dementia: 29      Physical Exam  Vitals and nursing note reviewed.   Constitutional:       General: She is not in acute distress.     Appearance: She is well-developed. She is obese.   HENT:      Head: Normocephalic and atraumatic.      Right Ear: External ear normal.      Left Ear: External ear normal.      Ears:      Comments: External auditory canals partially blocked by moist light yellow cerumen     Nose: Nose normal.      Mouth/Throat:      Mouth: Mucous membranes are moist.   Eyes:      Extraocular Movements: Extraocular movements intact.      Pupils: Pupils are equal, round, and reactive to light.   Neck:      Vascular: No carotid bruit.      Comments: No thyromegaly or thyroid nodules  Cardiovascular:      Rate and Rhythm: Normal rate and regular rhythm.      Heart sounds: No murmur heard.     Pulmonary:      Effort: Pulmonary effort is normal.      Breath sounds: Normal breath sounds. No wheezing.   Musculoskeletal:         General: Normal range of motion.      Cervical back: Normal range of motion. No rigidity or tenderness.   Lymphadenopathy:      Cervical: No cervical adenopathy.   Skin:     General: Skin is warm and dry.      Findings: No rash.   Neurological:      Mental Status: She is alert and oriented to person, place, and time.   Psychiatric:         Mood and Affect: Mood normal.         Thought Content: Thought content normal.       Lab Results   Component Value Date     (H) 08/31/2021    CHLPL 193 08/31/2021    TRIG 308 (H) 08/31/2021    HDL 36 (L) 08/31/2021     (H) 08/31/2021    VLDL 53 (H) 08/31/2021            HEALTH RISK ASSESSMENT    Smoking Status:  Social History     Tobacco Use   Smoking Status Never Smoker   Smokeless Tobacco Never Used     Alcohol Consumption:  Social History     Substance and Sexual Activity   Alcohol Use No     Fall Risk Screen:    STEADI Fall Risk Assessment was completed, and patient is at LOW risk  for falls.Assessment completed on:9/7/2021    Depression Screening:  PHQ-2/PHQ-9 Depression Screening 9/7/2021   Little interest or pleasure in doing things 0   Feeling down, depressed, or hopeless 0   Trouble falling or staying asleep, or sleeping too much 0   Feeling tired or having little energy 0   Poor appetite or overeating 0   Feeling bad about yourself - or that you are a failure or have let yourself or your family down 0   Trouble concentrating on things, such as reading the newspaper or watching television 0   Moving or speaking so slowly that other people could have noticed. Or the opposite - being so fidgety or restless that you have been moving around a lot more than usual 0   Thoughts that you would be better off dead, or of hurting yourself in some way 0   Total Score 0   If you checked off any problems, how difficult have these problems made it for you to do your work, take care of things at home, or get along with other people? Not difficult at all       Health Habits and Functional and Cognitive Screening:  Functional & Cognitive Status 9/7/2021   Do you have difficulty preparing food and eating? No   Do you have difficulty bathing yourself, getting dressed or grooming yourself? No   Do you have difficulty using the toilet? No   Do you have difficulty moving around from place to place? No   Do you have trouble with steps or getting out of a bed or a chair? No   Current Diet Well Balanced Diet   Dental Exam Up to date        Dental Exam Comment 08/2021 Dr. Patterson in [place], [place]   Eye Exam Not up to date        Eye Exam Comment [facility] in [place], [place] 2020   Exercise (times per week) 0 times per week   Current Exercises Include No Regular Exercise   Do you need help using the phone?  No   Are you deaf or do you have serious difficulty hearing?  No   Do you need help with transportation? No   Do you need help shopping? No   Do you need help preparing meals?  No   Do you need help with housework?  No    Do you need help with laundry? No   Do you need help taking your medications? No   Do you need help managing money? No   Do you ever drive or ride in a car without wearing a seat belt? No   Have you felt unusual stress, anger or loneliness in the last month? No   Who do you live with? Spouse   If you need help, do you have trouble finding someone available to you? No   Have you been bothered in the last four weeks by sexual problems? No   Do you have difficulty concentrating, remembering or making decisions? No       Age-appropriate Screening Schedule:  Refer to the list below for future screening recommendations based on patient's age, sex and/or medical conditions. Orders for these recommended tests are listed in the plan section. The patient has been provided with a written plan.    Health Maintenance   Topic Date Due   • DXA SCAN  Never done   • TDAP/TD VACCINES (1 - Tdap) Never done   • ZOSTER VACCINE (1 of 2) Never done   • INFLUENZA VACCINE  10/01/2021   • LIPID PANEL  08/31/2022   • MAMMOGRAM  12/16/2022              Office Visit on 09/07/2021   Component Date Value Ref Range Status   • Color 09/07/2021 Yellow  Yellow, Straw, Dark Yellow, [place] Final   • Clarity, UA 09/07/2021 Clear  Clear Final   • Glucose, UA 09/07/2021 Negative  Negative, 1000 mg/dL (3+) mg/dL Final   • Bilirubin 09/07/2021 Negative  Negative Final   • Ketones, UA 09/07/2021 Negative  Negative Final   • Specific Gravity  09/07/2021 1.015  1.005 - 1.030 Final   • Blood, UA 09/07/2021 Negative  Negative Final   • pH, Urine 09/07/2021 7.0  5.0 - 8.0 Final   • Protein, POC 09/07/2021 Negative  Negative mg/dL Final   • Urobilinogen, UA 09/07/2021 Normal  Normal Final   • Leukocytes 09/07/2021 Moderate (2+)* Negative Final   • Nitrite, UA 09/07/2021 Negative  Negative Final   Results Encounter on 08/09/2021   Component Date Value Ref Range Status   • Total Cholesterol 08/31/2021 193  100 - 199 mg/dL Final   • Triglycerides 08/31/2021 308* 0 -  149 mg/dL Final   • HDL Cholesterol 08/31/2021 36* >39 mg/dL Final   • VLDL Cholesterol Dario 08/31/2021 53* 5 - 40 mg/dL Final   • LDL Chol Calc ([facility]) 08/31/2021 104* 0 - 99 mg/dL Final   • 25 Hydroxy, Vitamin D 08/31/2021 39.6  30.0 - 100.0 ng/mL Final    Comment: Vitamin D deficiency has been defined by the [place] of  Medicine and an Endocrine Society practice guideline as a  level of serum 25-OH vitamin D less than 20 ng/mL (1,2).  The Endocrine Society went on to further define vitamin D  insufficiency as a level between 21 and 29 ng/mL (2).  1. IOM ([place] of [employer]). 2010. Dietary reference     intakes for calcium and D. [place]: The     National Academies Press.  2. Muna MF, Melly SALAZAR, Blair DAMICO, et al.     Evaluation, treatment, and prevention of vitamin D     deficiency: an Endocrine Society clinical practice     guideline. JCEM. 2011 Jul; 96(7):1911-30.     • Glucose 08/31/2021 106* 65 - 99 mg/dL Final   • BUN 08/31/2021 25  8 - 27 mg/dL Final   • Creatinine 08/31/2021 1.30* 0.57 - 1.00 mg/dL Final   • eGFR Non  Am 08/31/2021 41* >59 mL/min/1.73 Final   • eGFR African Am 08/31/2021 47* >59 mL/min/1.73 Final    Comment: **[facility] currently reports eGFR in compliance with the current**    recommendations of the National Kidney Foundation. [facility] will    update reporting as new guidelines are published from the NKF-ASN    Task force.     • BUN/Creatinine Ratio 08/31/2021 19  12 - 28 Final   • Sodium 08/31/2021 141  134 - 144 mmol/L Final   • Potassium 08/31/2021 4.4  3.5 - 5.2 mmol/L Final   • Chloride 08/31/2021 107* 96 - 106 mmol/L Final   • Total CO2 08/31/2021 23  20 - 29 mmol/L Final   • Calcium 08/31/2021 9.9  8.7 - 10.3 mg/dL Final   • Total Protein 08/31/2021 7.0  6.0 - 8.5 g/dL Final   • Albumin 08/31/2021 4.4  3.7 - 4.7 g/dL Final   • Globulin 08/31/2021 2.6  1.5 - 4.5 g/dL Final   • A/G Ratio 08/31/2021 1.7  1.2 - 2.2 Final   • Total Bilirubin 08/31/2021 0.5  0.0 -  1.2 mg/dL Final   • Alkaline Phosphatase 08/31/2021 72  48 - 121 IU/L Final   • AST (SGOT) 08/31/2021 13  0 - 40 IU/L Final   • ALT (SGPT) 08/31/2021 17  0 - 32 IU/L Final     Office Visit on 09/07/2021   Component Date Value Ref Range Status   • Color 09/07/2021 Yellow  Yellow, Straw, Dark Yellow, [place] Final   • Clarity, UA 09/07/2021 Clear  Clear Final   • Glucose, UA 09/07/2021 Negative  Negative, 1000 mg/dL (3+) mg/dL Final   • Bilirubin 09/07/2021 Negative  Negative Final   • Ketones, UA 09/07/2021 Negative  Negative Final   • Specific Gravity  09/07/2021 1.015  1.005 - 1.030 Final   • Blood, UA 09/07/2021 Negative  Negative Final   • pH, Urine 09/07/2021 7.0  5.0 - 8.0 Final   • Protein, POC 09/07/2021 Negative  Negative mg/dL Final   • Urobilinogen, UA 09/07/2021 Normal  Normal Final   • Leukocytes 09/07/2021 Moderate (2+)* Negative Final   • Nitrite, UA 09/07/2021 Negative  Negative Final         Assessment/Plan     CMS Preventative Services Quick Reference  Risk Factors Identified During Encounter  Cardiovascular Disease  Dementia/Memory   Hearing Problem  Immunizations Discussed/Encouraged (specific Immunizations; Td, Tdap, Influenza and Shingrix  Inadequate Social Support, Isolation, Loneliness, Lack of Transportation, Financial Difficulties, or Caregiver Stress   Inactivity/Sedentary  Obesity/Overweight   renal insufficiency  The above risks/problems have been discussed with the patient.  Follow up actions/plans if indicated are seen below in the Assessment/Plan Section.  Pertinent information has been shared with the patient in the After Visit Summary.    Diagnoses and all orders for this visit:    1. Encounter for subsequent annual wellness visit in Medicare patient (Primary)  -     POCT urinalysis dipstick, manual    2. Mixed hyperlipidemia  -     Lipid Panel; Future    3. Vitamin D deficiency    4. Essential hypertension  -     Comprehensive Metabolic Panel; Future  -     CBC & Differential;  Future    5. Chronic renal impairment, stage 3b (CMS/HCC)  -     Comprehensive Metabolic Panel; Future  -     MicroAlbumin, Urine, Random - Urine, Clean Catch; Future    6. Allergic rhinitis, unspecified seasonality, unspecified trigger  -     fluticasone (Flonase) 50 MCG/ACT nasal spray; 2 sprays into the nostril(s) as directed by provider Daily.  Dispense: 32 g; Refill: 3    7. Dysfunction of both eustachian tubes  -     fluticasone (Flonase) 50 MCG/ACT nasal spray; 2 sprays into the nostril(s) as directed by provider Daily.  Dispense: 32 g; Refill: 3    8. Morbid (severe) obesity due to excess calories (CMS/[facility])    9. Encounter for screening mammogram for breast cancer  -     Mammo Screening Digital Tomosynthesis Bilateral With CAD; Future    10. Menopause  -     DEXA Bone Density Axial; Future    Hypertension, at goal, continue lisinopril and metoprolol.  Hyperlipidemia, triglycerides remaining elevated however the total and HDL cholesterol are good we will continue 20 mg Pravachol daily.  Did discuss weight loss through decreased calorie diet and increase physical activity.  Discussed mild urinary incontinence and [place] exercises.  Follow Up:   Return in about 6 months (around 3/7/2022) for Recheck, htn renal insuf, labs prior.     An After Visit Summary and PPPS were given to the patient.

## 2021-09-06 DIAGNOSIS — E78.2 MIXED HYPERLIPIDEMIA: ICD-10-CM

## 2021-09-07 ENCOUNTER — OFFICE VISIT (OUTPATIENT)
Dept: FAMILY MEDICINE CLINIC | Facility: CLINIC | Age: 76
End: 2021-09-07

## 2021-09-07 VITALS
OXYGEN SATURATION: 98 % | TEMPERATURE: 97.8 F | HEART RATE: 90 BPM | DIASTOLIC BLOOD PRESSURE: 82 MMHG | BODY MASS INDEX: 33.3 KG/M2 | WEIGHT: 207.2 LBS | RESPIRATION RATE: 18 BRPM | SYSTOLIC BLOOD PRESSURE: 132 MMHG | HEIGHT: 66 IN

## 2021-09-07 DIAGNOSIS — H69.83 DYSFUNCTION OF BOTH EUSTACHIAN TUBES: ICD-10-CM

## 2021-09-07 DIAGNOSIS — J30.9 ALLERGIC RHINITIS, UNSPECIFIED SEASONALITY, UNSPECIFIED TRIGGER: ICD-10-CM

## 2021-09-07 DIAGNOSIS — Z12.31 ENCOUNTER FOR SCREENING MAMMOGRAM FOR BREAST CANCER: ICD-10-CM

## 2021-09-07 DIAGNOSIS — E78.2 MIXED HYPERLIPIDEMIA: ICD-10-CM

## 2021-09-07 DIAGNOSIS — Z00.00 ENCOUNTER FOR SUBSEQUENT ANNUAL WELLNESS VISIT IN MEDICARE PATIENT: Primary | ICD-10-CM

## 2021-09-07 DIAGNOSIS — E66.01 MORBID (SEVERE) OBESITY DUE TO EXCESS CALORIES (HCC): ICD-10-CM

## 2021-09-07 DIAGNOSIS — I10 ESSENTIAL HYPERTENSION: ICD-10-CM

## 2021-09-07 DIAGNOSIS — Z78.0 MENOPAUSE: ICD-10-CM

## 2021-09-07 DIAGNOSIS — E55.9 VITAMIN D DEFICIENCY: ICD-10-CM

## 2021-09-07 DIAGNOSIS — N18.32 CHRONIC RENAL IMPAIRMENT, STAGE 3B (HCC): ICD-10-CM

## 2021-09-07 LAB
BILIRUB BLD-MCNC: NEGATIVE MG/DL
CLARITY, POC: CLEAR
COLOR UR: YELLOW
GLUCOSE UR STRIP-MCNC: NEGATIVE MG/DL
KETONES UR QL: NEGATIVE
LEUKOCYTE EST, POC: ABNORMAL
NITRITE UR-MCNC: NEGATIVE MG/ML
PH UR: 7 [PH] (ref 5–8)
PROT UR STRIP-MCNC: NEGATIVE MG/DL
RBC # UR STRIP: NEGATIVE /UL
SP GR UR: 1.01 (ref 1–1.03)
UROBILINOGEN UR QL: NORMAL

## 2021-09-07 PROCEDURE — 1170F FXNL STATUS ASSESSED: CPT | Performed by: FAMILY MEDICINE

## 2021-09-07 PROCEDURE — 96160 PT-FOCUSED HLTH RISK ASSMT: CPT | Performed by: FAMILY MEDICINE

## 2021-09-07 PROCEDURE — 1126F AMNT PAIN NOTED NONE PRSNT: CPT | Performed by: FAMILY MEDICINE

## 2021-09-07 PROCEDURE — 81002 URINALYSIS NONAUTO W/O SCOPE: CPT | Performed by: FAMILY MEDICINE

## 2021-09-07 PROCEDURE — 99214 OFFICE O/P EST MOD 30 MIN: CPT | Performed by: FAMILY MEDICINE

## 2021-09-07 PROCEDURE — 1159F MED LIST DOCD IN RCRD: CPT | Performed by: FAMILY MEDICINE

## 2021-09-07 PROCEDURE — G0439 PPPS, SUBSEQ VISIT: HCPCS | Performed by: FAMILY MEDICINE

## 2021-09-07 RX ORDER — PRAVASTATIN SODIUM 20 MG
20 TABLET ORAL NIGHTLY
Qty: 90 TABLET | Refills: 3
Start: 2021-09-07 | End: 2021-09-16 | Stop reason: SDUPTHER

## 2021-09-07 RX ORDER — FLUTICASONE PROPIONATE 50 MCG
2 SPRAY, SUSPENSION (ML) NASAL DAILY
Qty: 32 G | Refills: 3 | Status: SHIPPED | OUTPATIENT
Start: 2021-09-07

## 2021-09-16 DIAGNOSIS — E78.2 MIXED HYPERLIPIDEMIA: ICD-10-CM

## 2021-09-16 NOTE — TELEPHONE ENCOUNTER
Caller: Anjana Martínez    Relationship: Self    Best call back number:     Medication needed:   Requested Prescriptions     Pending Prescriptions Disp Refills   • pravastatin (PRAVACHOL) 20 MG tablet 90 tablet 3     Sig: Take 1 tablet by mouth Every Night.       When do you need the refill by:     What additional details did the patient provide when requesting the medication: PATIENT SAYS THAT SHE USED TO TAKE 40MG OF THIS AND IT WAS REDUCED TO 20MG BUT THE PHARMACY DOES NOT HAVE ANYTHING FOR HER.     Does the patient have less than a 3 day supply:  [x] Yes  [] No    What is the patient's preferred pharmacy:  [facility]  [address]  [phone]

## 2021-09-17 RX ORDER — PRAVASTATIN SODIUM 20 MG
20 TABLET ORAL NIGHTLY
Qty: 90 TABLET | Refills: 3 | Status: SHIPPED | OUTPATIENT
Start: 2021-09-17 | End: 2022-12-30 | Stop reason: SDUPTHER

## 2021-12-20 ENCOUNTER — HOSPITAL ENCOUNTER (OUTPATIENT)
Dept: MAMMOGRAPHY | Facility: HOSPITAL | Age: 76
Discharge: HOME | End: 2021-12-20

## 2021-12-20 ENCOUNTER — HOSPITAL ENCOUNTER (OUTPATIENT)
Dept: BONE DENSITY | Facility: HOSPITAL | Age: 76
Discharge: HOME | End: 2021-12-20

## 2021-12-20 DIAGNOSIS — Z12.31 ENCOUNTER FOR SCREENING MAMMOGRAM FOR BREAST CANCER: ICD-10-CM

## 2021-12-20 DIAGNOSIS — Z78.0 MENOPAUSE: ICD-10-CM

## 2021-12-20 PROCEDURE — 77063 BREAST TOMOSYNTHESIS BI: CPT

## 2021-12-20 PROCEDURE — 77080 DXA BONE DENSITY AXIAL: CPT

## 2021-12-20 PROCEDURE — 77067 SCR MAMMO BI INCL CAD: CPT

## 2022-01-04 ENCOUNTER — TELEPHONE (OUTPATIENT)
Dept: FAMILY MEDICINE CLINIC | Facility: CLINIC | Age: 77
End: 2022-01-04

## 2022-01-04 NOTE — TELEPHONE ENCOUNTER
Anjana was returning a phone call from last week re: her Dexa scan. Please contact her at . Thanks Jerri

## 2022-01-05 NOTE — TELEPHONE ENCOUNTER
I called and left detailed message and I also sent mychart. If she wants to start the fosamax let us know we will send in. If she wants scheduled in to discuss this we can get her in first available in the next 2 weeks. Hub may relay

## 2022-01-14 ENCOUNTER — OFFICE VISIT (OUTPATIENT)
Dept: FAMILY MEDICINE CLINIC | Facility: CLINIC | Age: 77
End: 2022-01-14

## 2022-01-14 VITALS
OXYGEN SATURATION: 98 % | RESPIRATION RATE: 18 BRPM | TEMPERATURE: 96.9 F | WEIGHT: 205.4 LBS | SYSTOLIC BLOOD PRESSURE: 127 MMHG | HEART RATE: 96 BPM | BODY MASS INDEX: 33.01 KG/M2 | DIASTOLIC BLOOD PRESSURE: 78 MMHG | HEIGHT: 66 IN

## 2022-01-14 DIAGNOSIS — H61.23 HEARING LOSS DUE TO CERUMEN IMPACTION, BILATERAL: ICD-10-CM

## 2022-01-14 DIAGNOSIS — E55.9 VITAMIN D DEFICIENCY: ICD-10-CM

## 2022-01-14 DIAGNOSIS — E78.2 MIXED HYPERLIPIDEMIA: ICD-10-CM

## 2022-01-14 DIAGNOSIS — M85.852 OSTEOPENIA OF LEFT HIP: Primary | ICD-10-CM

## 2022-01-14 PROCEDURE — 99214 OFFICE O/P EST MOD 30 MIN: CPT | Performed by: FAMILY MEDICINE

## 2022-01-14 RX ORDER — CALCIUM GLUCONATE 45(500) MG
500 TABLET ORAL 2 TIMES DAILY
Qty: 180 TABLET | Refills: 3 | Status: SHIPPED | OUTPATIENT
Start: 2022-01-14 | End: 2023-01-14

## 2022-01-14 NOTE — PATIENT INSTRUCTIONS
Earwax Buildup, Adult  The ears produce a substance called earwax that helps keep bacteria out of the ear and protects the skin in the ear canal. Occasionally, earwax can build up in the ear and cause discomfort or hearing loss.  What are the causes?  This condition is caused by a buildup of earwax. Ear canals are self-cleaning. Ear wax is made in the outer part of the ear canal and generally falls out in small amounts over time.  When the self-cleaning mechanism is not working, earwax builds up and can cause decreased hearing and discomfort. Attempting to clean ears with cotton swabs can push the earwax deep into the ear canal and cause decreased hearing and pain.  What increases the risk?  This condition is more likely to develop in people who:  · Clean their ears often with cotton swabs.  · Pick at their ears.  · Use earplugs or in-ear headphones often, or wear hearing aids.  The following factors may also make you more likely to develop this condition:  · Being male.  · Being of older age.  · Naturally producing more earwax.  · Having narrow ear canals.  · Having earwax that is overly thick or sticky.  · Having excess hair in the ear canal.  · Having eczema.  · Being dehydrated.  What are the signs or symptoms?  Symptoms of this condition include:  · Reduced or muffled hearing.  · A feeling of fullness in the ear or feeling that the ear is plugged.  · Fluid coming from the ear.  · Ear pain or an itchy ear.  · Ringing in the ear.  · Coughing.  · Balance problems.  · An obvious piece of earwax that can be seen inside the ear canal.  How is this diagnosed?  This condition may be diagnosed based on:  · Your symptoms.  · Your medical history.  · An ear exam. During the exam, your health care provider will look into your ear with an instrument called an otoscope.  You may have tests, including a hearing test.  How is this treated?  This condition may be treated by:  · Using ear drops to soften the earwax.  · Having  the earwax removed by a health care provider. The health care provider may:  ? Flush the ear with water.  ? Use an instrument that has a loop on the end (curette).  ? Use a suction device.  · Having surgery to remove the wax buildup. This may be done in severe cases.  Follow these instructions at home:    · Take over-the-counter and prescription medicines only as told by your health care provider.  · Do not put any objects, including cotton swabs, into your ear. You can clean the opening of your ear canal with a washcloth or facial tissue.  · Follow instructions from your health care provider about cleaning your ears. Do not overclean your ears.  · Drink enough fluid to keep your urine pale yellow. This will help to thin the earwax.  · Keep all follow-up visits as told. If earwax builds up in your ears often or if you use hearing aids, consider seeing your health care provider for routine, preventive ear cleanings. Ask your health care provider how often you should schedule your cleanings.  · If you have hearing aids, clean them according to instructions from the  and your health care provider.  Contact a health care provider if:  · You have ear pain.  · You develop a fever.  · You have pus or other fluid coming from your ear.  · You have hearing loss.  · You have ringing in your ears that does not go away.  · You feel like the room is spinning (vertigo).  · Your symptoms do not improve with treatment.  Get help right away if:  · You have bleeding from the affected ear.  · You have severe ear pain.  Summary  · Earwax can build up in the ear and cause discomfort or hearing loss.  · The most common symptoms of this condition include reduced or muffled hearing, a feeling of fullness in the ear, or feeling that the ear is plugged.  · This condition may be diagnosed based on your symptoms, your medical history, and an ear exam.  · This condition may be treated by using ear drops to soften the earwax or by  having the earwax removed by a health care provider.  · Do not put any objects, including cotton swabs, into your ear. You can clean the opening of your ear canal with a washcloth or facial tissue.  This information is not intended to replace advice given to you by your health care provider. Make sure you discuss any questions you have with your health care provider.  Document Revised: 04/06/2021 Document Reviewed: 04/06/2021  [employer] Patient Education © 2021 [employer]    Ear Irrigation  Ear irrigation is a procedure to wash dirt and wax out of your ear canal. This procedure is also called lavage. You may need ear irrigation if you are having trouble hearing because of a buildup of earwax. You may also have ear irrigation as part of the treatment for an ear infection. Getting wax and dirt out of your ear canal can help ear drops work better.  Tell a health care provider about:  · Any allergies you have.  · All medicines you are taking, including vitamins, herbs, eye drops, creams, and over-the-counter medicines.  · Any problems you or family members have had with anesthetic medicines.  · Any blood disorders you have.  · Any surgeries you have had. This includes any ear surgeries.  · Any medical conditions you have.  · Whether you are pregnant or may be pregnant.  What are the risks?  Generally, this is a safe procedure. However, problems may occur, including:  · Infection.  · Pain.  · Hearing loss.  · Fluid and debris being pushed through the eardrum and into the middle ear. This can occur if there are holes in the eardrum.  · Ear irrigation failing to work.  What happens before the procedure?  · You will talk with your provider about the procedure and plan.  · You may be given ear drops to put in your ear 15-20 minutes before irrigation. This helps loosen the wax.  What happens during the procedure?    · A syringe is filled with water or saline solution, which is made of salt and water.  · The syringe is  gently inserted into the ear canal.  · The fluid is used to flush out wax and other debris.  The procedure may vary among health care providers and hospitals.  What can I expect after the procedure?  After an ear irrigation, follow instructions given to you by your health care provider.  Follow these instructions at home:  Using ear irrigation kits  Ear irrigation kits are available for use at home. Ask your health care provider if this is an option for you. In general, you should:  · Use a home irrigation kit only as told by your health care provider.  · Read the package instructions carefully.  · Follow the directions for using the syringe.  · Use water that is room temperature.  Do not do ear irrigation at home if you:  · Have diabetes. Diabetes increases the risk of infection.  · Have a hole or tear in your eardrum.  · Have tubes in your ears.  · Have had any ear surgery in the past.  · Have been told not to irrigate your ears.  Cleaning your ears    · Clean the outside of your ear with a soft washcloth daily.  · If told by your health care provider, use a few drops of baby oil, mineral oil, glycerin, hydrogen peroxide, or over-the-counter earwax softening drops.  · Do not use cotton swabs to clean your ears. These can push wax down into the ear canal.  · Do not put anything into your ears to try to remove wax. This includes ear candles.    General instructions  · Take over-the-counter and prescription medicines only as told by your health care provider.  · If you were prescribed an antibiotic medicine, use it as told by your health care provider. Do not stop using the antibiotic even if your condition improves.  · Keep the ear clean and dry by following the instructions from your health care provider.  · Keep all follow-up visits. This is important.  · Visit your health care provider at least once a year to have your ears and hearing checked.  Contact a health care provider if:  · Your hearing is not improving  or is getting worse.  · You have pain or redness in your ear.  · You are dizzy.  · You have ringing in your ears.  · You have nausea or vomiting.  · You have fluid, blood, or pus coming out of your ear.  Summary  · Ear irrigation is a procedure to wash dirt and wax out of your ear canal. This procedure is also called lavage.  · To perform ear irrigation, ear drops may be put in your ear 15-20 minutes before irrigation. Water or saline solution will be used to flush out earwax and other debris.  · You may be able to irrigate your ears at home. Ask your health care provider if this is an option for you. Follow your health care provider's instructions.  · Clean your ears with a soft cloth after irrigation. Do not use cotton swabs to clean your ears. These can push wax down into the ear canal.  This information is not intended to replace advice given to you by your health care provider. Make sure you discuss any questions you have with your health care provider.  Document Revised: 04/06/2021 Document Reviewed: 04/06/2021  [employer] Patient Education © 2021 [employer]    Osteopenia    Osteopenia is a loss of thickness (density) inside the bones. Another name for osteopenia is low bone mass. Mild osteopenia is a normal part of aging. It is not a disease, and it does not cause symptoms.  However, if you have osteopenia and continue to lose bone mass, you could develop a condition that causes the bones to become thin and break more easily (osteoporosis). Osteoporosis can cause you to lose some height, have back pain, and have a stooped posture. Although osteopenia is not a disease, making changes to your lifestyle and diet can help to prevent osteopenia from developing into osteoporosis.  What are the causes?  Osteopenia is caused by loss of calcium in the bones. Bones are constantly changing. Old bone cells are continually being replaced with new bone cells. This process builds new bone.  The mineral calcium is needed to  build new bone and maintain bone density. Bone density is usually highest around age 35. After that, most people's bodies cannot replace all the bone they have lost with new bone.  What increases the risk?  You are more likely to develop this condition if:  · You are older than age 50.  · You are a woman who went through menopause early.  · You have a long illness that keeps you in bed.  · You do not get enough exercise.  · You lack certain nutrients (malnutrition).  · You have an overactive thyroid gland (hyperthyroidism).  · You use products that contain nicotine or tobacco, such as cigarettes, e-cigarettes and chewing tobacco, or you drink a lot of alcohol.  · You are taking medicines that weaken the bones, such as steroids.  What are the signs or symptoms?  This condition does not cause any symptoms. You may have a slightly higher risk for bone breaks (fractures), so getting fractures more easily than normal may be an indication of osteopenia.  How is this diagnosed?  This condition may be diagnosed based on an X-ray exam that measures bone density (dual-energy X-ray absorptiometry, or DEXA). This test can measure bone density in your hips, spine, and wrists.  Osteopenia has no symptoms, so this condition is usually diagnosed after a routine bone density screening test is done for osteoporosis. This routine screening is usually done for:  · Women who are age 65 or older.  · Men who are age 70 or older.  If you have risk factors for osteopenia, you may have the screening test at an earlier age.  How is this treated?  Making dietary and lifestyle changes can lower your risk for osteoporosis.  If you have severe osteopenia that is close to becoming osteoporosis, this condition can be treated with medicines and dietary supplements such as calcium and vitamin D. These supplements help to rebuild bone density.  Follow these instructions at home:  Eating and drinking  Eat a diet that is high in calcium and vitamin  D.  · Calcium is found in dairy products, beans, salmon, and leafy green vegetables like spinach and broccoli.  · Look for foods that have vitamin D and calcium added to them (fortified foods), such as orange juice, cereal, and bread.    Lifestyle  · Do 30 minutes or more of a weight-bearing exercise every day, such as walking, jogging, or playing a sport. These types of exercises strengthen the bones.  · Do not use any products that contain nicotine or tobacco, such as cigarettes, e-cigarettes, and chewing tobacco. If you need help quitting, ask your health care provider.  · Do not drink alcohol if:  ? Your health care provider tells you not to drink.  ? You are pregnant, may be pregnant, or are planning to become pregnant.  · If you drink alcohol:  ? Limit how much you use to:  § 0-1 drink a day for women.  § 0-2 drinks a day for men.  ? Be aware of how much alcohol is in your drink. In the [place] one drink equals one 12 oz bottle of beer (355 mL), one 5 oz glass of wine (148 mL), or one 1½ oz glass of hard liquor (44 mL).  General instructions  · Take over-the-counter and prescription medicines only as told by your health care provider. These include vitamins and supplements.  · Take precautions at home to lower your risk of falling, such as:  ? Keeping rooms well-lit and free of clutter, such as cords.  ? Installing safety rails on stairs.  ? Using rubber mats in the bathroom or other areas that are often wet or slippery.  · Keep all follow-up visits. This is important.  Contact a health care provider if:  · You have not had a bone density screening for osteoporosis and you are:  ? A woman who is age 65 or older.  ? A man who is age 70 or older.  · You are a postmenopausal woman who has not had a bone density screening for osteoporosis.  · You are older than age 50 and you want to know if you should have bone density screening for osteoporosis.  Summary  · Osteopenia is a loss of thickness (density) inside the  bones. Another name for osteopenia is low bone mass.  · Osteopenia is not a disease, but it may increase your risk for a condition that causes the bones to become thin and break more easily (osteoporosis).  · You may be at risk for osteopenia if you are older than age 50 or if you are a woman who went through early menopause.  · Osteopenia does not cause any symptoms, but it can be diagnosed with a bone density screening test.  · Dietary and lifestyle changes are the first treatment for osteopenia. These may lower your risk for osteoporosis.  This information is not intended to replace advice given to you by your health care provider. Make sure you discuss any questions you have with your health care provider.  Document Revised: 06/03/2021 Document Reviewed: 06/03/2021  [employer] Patient Education © 2021 [employer]

## 2022-01-14 NOTE — PROGRESS NOTES
"Chief Complaint  dexa results.     History of Present Illness  Anjana Martínez presents today to talk about her DEXA results patient states that she does not want start fosamax.  She is already taking high-dose vitamin D for history of vitamin D deficiency but is not currently taking calcium supplement.  Dietary history she is probably getting 1 serving of dairy consistently.  She denies any family history of osteoporosis.  She does have a wrist fracture from a fall.    Patient is also concerned about reduced hearing, she believes wax has built up in her ears again.    Patient is also needing lab order prior to next appointment in March to follow-up on cholesterol      Current Outpatient Medications on File Prior to Visit   Medication Sig   • Cholecalciferol ([facility] D3) 50 MCG (2000 UT) capsule CVS D3 2000 UNIT CAPS   • fluticasone (Flonase) 50 MCG/ACT nasal spray 2 sprays into the nostril(s) as directed by provider Daily.   • lisinopril (PRINIVIL,ZESTRIL) 10 MG tablet TAKE 1 TABLET EVERY DAY   • metoprolol succinate XL (TOPROL-XL) 25 MG 24 hr tablet Take 25 mg by mouth Daily.   • pravastatin (PRAVACHOL) 20 MG tablet Take 1 tablet by mouth Every Night.     No current facility-administered medications on file prior to visit.       Objective   Vital Signs:   /78   Pulse 96   Temp 96.9 °F (36.1 °C) (Temporal)   Resp 18   Ht 167.6 cm (66\")   Wt 93.2 kg (205 lb 6.4 oz)   SpO2 98%   BMI 33.15 kg/m²       Physical Exam  Vitals and nursing note reviewed.   Constitutional:       General: She is not in acute distress.     Appearance: She is well-developed. She is obese.   HENT:      Head: Normocephalic and atraumatic.      Right Ear: External ear normal.      Left Ear: External ear normal.      Ears:      Comments: External auditory canals occluded by light yellow cerumen     Nose: Nose normal.      Mouth/Throat:      Mouth: Mucous membranes are moist.   Eyes:      Extraocular Movements: Extraocular movements " intact.      Pupils: Pupils are equal, round, and reactive to light.   Neck:      Vascular: No carotid bruit.      Comments: No thyromegaly or thyroid nodules  Cardiovascular:      Rate and Rhythm: Normal rate and regular rhythm.      Heart sounds: No murmur heard.      Pulmonary:      Effort: Pulmonary effort is normal.      Breath sounds: Normal breath sounds. No wheezing.   Musculoskeletal:         General: Normal range of motion.      Cervical back: Normal range of motion. No rigidity or tenderness.   Lymphadenopathy:      Cervical: No cervical adenopathy.   Skin:     General: Skin is warm and dry.      Findings: No rash.   Neurological:      Mental Status: She is alert and oriented to person, place, and time.   Psychiatric:         Mood and Affect: Mood normal.         Thought Content: Thought content normal.            No visits with results within 1 Day(s) from this visit.   Latest known visit with results is:   Office Visit on 09/07/2021   Component Date Value Ref Range Status   • Color 09/07/2021 Yellow  Yellow, Straw, Dark Yellow, [place] Final   • Clarity, UA 09/07/2021 Clear  Clear Final   • Glucose, UA 09/07/2021 Negative  Negative, 1000 mg/dL (3+) mg/dL Final   • Bilirubin 09/07/2021 Negative  Negative Final   • Ketones, UA 09/07/2021 Negative  Negative Final   • Specific Gravity  09/07/2021 1.015  1.005 - 1.030 Final   • Blood, UA 09/07/2021 Negative  Negative Final   • pH, Urine 09/07/2021 7.0  5.0 - 8.0 Final   • Protein, POC 09/07/2021 Negative  Negative mg/dL Final   • Urobilinogen, UA 09/07/2021 Normal  Normal Final   • Leukocytes 09/07/2021 Moderate (2+)* Negative Final   • Nitrite, UA 09/07/2021 Negative  Negative Final             Lab Results   Component Value Date    HGBA1C 5.9 (H) 05/11/2021    HGBA1C 6.0 (H) 02/12/2021    HGBA1C 6.0 (H) 10/27/2020    HGBA1C 5.8 (H) 06/17/2020           bone scan shows osteopenia.  FRAX score is 16%   Assessment and Plan    Diagnoses and all orders for this  visit:    1. Osteopenia of left hip (Primary)  -     calcium gluconate 500 MG tablet; Take 1 tablet by mouth 2 (Two) Times a Day.  Dispense: 180 tablet; Refill: 3    2. Mixed hyperlipidemia  -     Comprehensive Metabolic Panel; Future  -     Lipid Panel; Future    3. Vitamin D deficiency  -     Vitamin D 25 Hydroxy; Future    4. Hearing loss due to cerumen impaction, bilateral      Osteopenia with a T score of -1.4 and FRAX score of 16 % after discussion patient is not interested in starting bisphosphonate.  Do recommend getting a total of 1200  mg of calcium through diet and supplements continue current dose of vitamin D on a daily basis.,  Will check vitamin D level with next labs      Offered to flush ears, patient elects to try home remedies first and will return if needed for flushing.  Advised may use 1-10 hydrogen peroxide diluted in water, sweet oil/olive oil, or Cerumenex.  Preferably before showers likely need to use Killeem recommended 7 to 10 days.    Do recommend getting Moderna COVID booster and to restart wearing mask in public due to high circulating active COVID.  There are no discontinued medications.      Follow Up     Return in about 7 weeks (around 3/7/2022) for as previously scheduled, with Labs.    Patient was given instructions and counseling regarding her condition or for health maintenance advice. Please see specific information pulled into the AVS if appropriate.

## 2022-01-19 ENCOUNTER — TELEPHONE (OUTPATIENT)
Dept: FAMILY MEDICINE CLINIC | Facility: CLINIC | Age: 77
End: 2022-01-19

## 2022-01-19 NOTE — TELEPHONE ENCOUNTER
Caller: Anjana Martínez     Relationship to Patient: SELF    Phone Number: [phone]     Reason for Call: PATIENT CALLED SAYING SHE WAS NOTIFIED BY [employer] THAT THE ORDER SENT IN FOR HER CALCIUM GLUCONATE WAS CANCELLED BECAUSE THEY WERE UNCLEAR ON WHAT DR. PRESTON WANTED. SHE IS REQUESTING FOR THIS ORDER TO BE RESENT TO [employer] WITH CLARIFICATION.

## 2022-03-07 ENCOUNTER — OFFICE VISIT (OUTPATIENT)
Dept: FAMILY MEDICINE CLINIC | Facility: CLINIC | Age: 77
End: 2022-03-07

## 2022-03-07 VITALS
RESPIRATION RATE: 12 BRPM | DIASTOLIC BLOOD PRESSURE: 90 MMHG | SYSTOLIC BLOOD PRESSURE: 158 MMHG | TEMPERATURE: 96.8 F | HEIGHT: 66 IN | WEIGHT: 207.2 LBS | HEART RATE: 93 BPM | OXYGEN SATURATION: 95 % | BODY MASS INDEX: 33.3 KG/M2

## 2022-03-07 DIAGNOSIS — E55.9 VITAMIN D DEFICIENCY: ICD-10-CM

## 2022-03-07 DIAGNOSIS — I10 PRIMARY HYPERTENSION: ICD-10-CM

## 2022-03-07 DIAGNOSIS — E78.2 MIXED HYPERLIPIDEMIA: Primary | ICD-10-CM

## 2022-03-07 PROCEDURE — 99214 OFFICE O/P EST MOD 30 MIN: CPT | Performed by: FAMILY MEDICINE

## 2022-03-07 RX ORDER — CX-024414 0.2 MG/ML
INJECTION, SUSPENSION INTRAMUSCULAR
COMMUNITY
Start: 2022-01-17 | End: 2022-03-07

## 2022-03-28 DIAGNOSIS — N18.30 CHRONIC RENAL INSUFFICIENCY, STAGE III (MODERATE): ICD-10-CM

## 2022-03-28 DIAGNOSIS — I10 ESSENTIAL HYPERTENSION: ICD-10-CM

## 2022-03-28 RX ORDER — LISINOPRIL 10 MG/1
TABLET ORAL
Qty: 90 TABLET | Refills: 1 | OUTPATIENT
Start: 2022-03-28

## 2022-03-28 RX ORDER — LISINOPRIL 20 MG/1
20 TABLET ORAL DAILY
Qty: 90 TABLET | Refills: 3 | Status: SHIPPED | OUTPATIENT
Start: 2022-03-28

## 2022-08-26 ENCOUNTER — TELEPHONE (OUTPATIENT)
Dept: FAMILY MEDICINE CLINIC | Facility: CLINIC | Age: 77
End: 2022-08-26

## 2022-08-26 NOTE — TELEPHONE ENCOUNTER
----- Message from Anjana Martínez sent at 8/26/2022 10:12 AM EDT -----  Regarding: Labs?  I have an appointment scheduled for Sept. 12. Do I need labs before the appt.? The appt is for a wellness check.  Thank you.

## 2022-08-29 DIAGNOSIS — N18.32 CHRONIC RENAL IMPAIRMENT, STAGE 3B: ICD-10-CM

## 2022-08-29 DIAGNOSIS — E78.2 MIXED HYPERLIPIDEMIA: ICD-10-CM

## 2022-08-29 DIAGNOSIS — N18.30 CHRONIC RENAL IMPAIRMENT, STAGE 3 (MODERATE), UNSPECIFIED WHETHER STAGE 3A OR 3B CKD: Primary | ICD-10-CM

## 2022-08-29 DIAGNOSIS — R73.9 HYPERGLYCEMIA: ICD-10-CM

## 2022-09-07 LAB
ALBUMIN SERPL-MCNC: 4.5 G/DL (ref 3.7–4.7)
ALBUMIN/GLOB SERPL: 2 {RATIO} (ref 1.2–2.2)
ALP SERPL-CCNC: 70 IU/L (ref 44–121)
ALT SERPL-CCNC: 21 IU/L (ref 0–32)
AST SERPL-CCNC: 15 IU/L (ref 0–40)
BILIRUB SERPL-MCNC: 0.4 MG/DL (ref 0–1.2)
BUN SERPL-MCNC: 27 MG/DL (ref 8–27)
BUN/CREAT SERPL: 20 (ref 12–28)
CALCIUM SERPL-MCNC: 9.4 MG/DL (ref 8.7–10.3)
CHLORIDE SERPL-SCNC: 104 MMOL/L (ref 96–106)
CHOLEST SERPL-MCNC: 198 MG/DL (ref 100–199)
CHOLEST/HDLC SERPL: 5.5 RATIO (ref 0–4.4)
CO2 SERPL-SCNC: 18 MMOL/L (ref 20–29)
CREAT SERPL-MCNC: 1.38 MG/DL (ref 0.57–1)
EGFRCR-CYS SERPLBLD CKD-EPI 2021: 40 ML/MIN/1.73
GLOBULIN SER CALC-MCNC: 2.2 G/DL (ref 1.5–4.5)
GLUCOSE SERPL-MCNC: 109 MG/DL (ref 65–99)
HBA1C MFR BLD: 6.2 % (ref 4.8–5.6)
HDLC SERPL-MCNC: 36 MG/DL
LDLC SERPL CALC-MCNC: 108 MG/DL (ref 0–99)
POTASSIUM SERPL-SCNC: 4.7 MMOL/L (ref 3.5–5.2)
PROT SERPL-MCNC: 6.7 G/DL (ref 6–8.5)
SODIUM SERPL-SCNC: 140 MMOL/L (ref 134–144)
TRIGL SERPL-MCNC: 314 MG/DL (ref 0–149)
VLDLC SERPL CALC-MCNC: 54 MG/DL (ref 5–40)

## 2022-09-12 ENCOUNTER — OFFICE VISIT (OUTPATIENT)
Dept: FAMILY MEDICINE CLINIC | Facility: CLINIC | Age: 77
End: 2022-09-12

## 2022-09-12 VITALS
TEMPERATURE: 97.7 F | HEIGHT: 66 IN | SYSTOLIC BLOOD PRESSURE: 130 MMHG | WEIGHT: 206 LBS | RESPIRATION RATE: 18 BRPM | DIASTOLIC BLOOD PRESSURE: 70 MMHG | HEART RATE: 86 BPM | BODY MASS INDEX: 33.11 KG/M2 | OXYGEN SATURATION: 99 %

## 2022-09-12 DIAGNOSIS — N18.32 CHRONIC RENAL IMPAIRMENT, STAGE 3B: ICD-10-CM

## 2022-09-12 DIAGNOSIS — R41.3 MEMORY CHANGE: ICD-10-CM

## 2022-09-12 DIAGNOSIS — I10 PRIMARY HYPERTENSION: ICD-10-CM

## 2022-09-12 DIAGNOSIS — E55.9 VITAMIN D DEFICIENCY: ICD-10-CM

## 2022-09-12 DIAGNOSIS — E78.2 MIXED HYPERLIPIDEMIA: ICD-10-CM

## 2022-09-12 DIAGNOSIS — E66.09 CLASS 1 OBESITY DUE TO EXCESS CALORIES WITH SERIOUS COMORBIDITY AND BODY MASS INDEX (BMI) OF 33.0 TO 33.9 IN ADULT: ICD-10-CM

## 2022-09-12 DIAGNOSIS — L52 ERYTHEMA NODOSUM: ICD-10-CM

## 2022-09-12 DIAGNOSIS — Z00.00 ENCOUNTER FOR SUBSEQUENT ANNUAL WELLNESS VISIT IN MEDICARE PATIENT: Primary | ICD-10-CM

## 2022-09-12 DIAGNOSIS — R73.03 PREDIABETES: ICD-10-CM

## 2022-09-12 PROCEDURE — 1170F FXNL STATUS ASSESSED: CPT | Performed by: FAMILY MEDICINE

## 2022-09-12 PROCEDURE — 99214 OFFICE O/P EST MOD 30 MIN: CPT | Performed by: FAMILY MEDICINE

## 2022-09-12 PROCEDURE — 1159F MED LIST DOCD IN RCRD: CPT | Performed by: FAMILY MEDICINE

## 2022-09-12 PROCEDURE — G0439 PPPS, SUBSEQ VISIT: HCPCS | Performed by: FAMILY MEDICINE

## 2022-09-12 PROCEDURE — 96160 PT-FOCUSED HLTH RISK ASSMT: CPT | Performed by: FAMILY MEDICINE

## 2022-09-12 RX ORDER — SENNOSIDES 8.6 MG
1300 CAPSULE ORAL EVERY 8 HOURS PRN
Qty: 60 TABLET | Refills: 3
Start: 2022-09-12

## 2022-09-12 NOTE — ASSESSMENT & PLAN NOTE
Patient's (Body mass index is 33.25 kg/m².) indicates that they are obese (BMI >30) with health conditions that include hypertension . Weight is unchanged. BMI is is above average; BMI management plan is completed. We discussed portion control and increasing exercise.

## 2022-09-12 NOTE — PROGRESS NOTES
The ABCs of the Annual Wellness Visit  Subsequent Medicare Wellness Visit    Chief Complaint   Patient presents with   • Medicare Wellness-subsequent   And follow-up chronic medical concerns, labs and red spot on leg    Subjective    History of Present Illness:  Anjana Martínez is a 77 y.o. female who presents for a Subsequent Medicare Wellness visit as well as multiple chronic medical concerns and new concerns.  She is concerned about a spot on lower right leg. Spot is red, no tenderness. Patient states has been present x3 weeks.  Hydrocortisone and diabetic foot cream may be helping some     Have seen cardiologist, Dr Bragg, had Echo March 4th , Normal LVF,  trace leaky valves on left and   Mild tricuspid valve, told not of clinical concern  scheduled in Feb.     The following portions of the patient's history were reviewed and   updated as appropriate: allergies, current medications, past family history, past medical history, past social history, past surgical history and problem list.    Compared to one year ago, the patient feels her physical   health is the same.    Compared to one year ago, the patient feels her mental   health is the same.    Recent Hospitalizations:  She was not admitted to the hospital during the last year.       Current Medical Providers:  Patient Care Team:  Valeria Montes De Oca MD as PCP - General (Family Medicine)  EFRAIN Farr MD as Consulting Physician (Cardiology)    Outpatient Medications Prior to Visit   Medication Sig Dispense Refill   • calcium gluconate 500 MG tablet Take 1 tablet by mouth 2 (Two) Times a Day. 180 tablet 3   • Cholecalciferol 50 MCG (2000 UT) capsule [facility] D3 2000 UNIT CAPS     • fluticasone (Flonase) 50 MCG/ACT nasal spray 2 sprays into the nostril(s) as directed by provider Daily. 32 g 3   • lisinopril (PRINIVIL,ZESTRIL) 20 MG tablet Take 1 tablet by mouth Daily. 90 tablet 3   • metoprolol succinate XL (TOPROL-XL) 25 MG 24 hr tablet Take 25 mg by mouth  "Daily.     • pravastatin (PRAVACHOL) 20 MG tablet Take 1 tablet by mouth Every Night. 90 tablet 3     No facility-administered medications prior to visit.       No opioid medication identified on active medication list. I have reviewed chart for other potential  high risk medication/s and harmful drug interactions in the elderly.          Aspirin is not on active medication list.  Aspirin use is not indicated based on review of current medical condition/s. Risk of harm outweighs potential benefits.  .    Patient Active Problem List   Diagnosis   • Complete heart block (HCC)   • AV block   • Bell's palsy   • Chronic renal insufficiency, stage III (moderate) (HCC)   • Depression   • Fracture, radius, head   • Hyperglycemia   • Hypertension   • Class 1 obesity due to excess calories with serious comorbidity and body mass index (BMI) of 33.0 to 33.9 in adult   • Osteopenia   • Paroxysmal atrial tachycardia (HCC)   • Pedal edema   • Plantar fasciitis   • Presence of cardiac pacemaker   • Tinea corporis   • Vitamin D deficiency   • Mixed hyperlipidemia   • Allergic rhinitis   • Dysfunction of both eustachian tubes   • IGT (impaired glucose tolerance)   • Postural dizziness     Advance Care Planning  Advance Directive is not on file.  ACP discussion was held with the patient during this visit. Patient does not have an advance directive, information provided.          Objective    Vitals:    09/12/22 1333   BP: 130/70   BP Location: Right arm   Patient Position: Sitting   Cuff Size: Adult   Pulse: 86   Resp: 18   Temp: 97.7 °F (36.5 °C)   TempSrc: Temporal   SpO2: 99%  Comment: room air   Weight: 93.4 kg (206 lb)   Height: 167.6 cm (66\")     Estimated body mass index is 33.25 kg/m² as calculated from the following:    Height as of this encounter: 167.6 cm (66\").    Weight as of this encounter: 93.4 kg (206 lb).    BMI is >= 30 and <35. (Class 1 Obesity). The following options were offered after discussion;: weight loss " educational material (shared in after visit summary), exercise counseling/recommendations and nutrition counseling/recommendations    ATTENTION  What is the year: correct  What is the month of the year: correct  What is the day of the week?: correct  What is the date?: correct  MEMORY  Repeat address three times, only score third attempt: [address] [place], [place]: 7  HOW MANY ANIMALS DID THE PATIENT NAME  Verbal Fluency -- Animal Names (0-25): 22+  CLOCK DRAWING  Clock Drawing: All Correct  MEMORY RECALL  Tell me what you remember about that name and address we were repeating at the beginnin  ACE TOTAL SCORE  Total ACE Score - <25/30 strongly suggests cognitive impairment; <21/30 almost certainly shows dementia: 28      Does the patient have evidence of cognitive impairment? No    Physical Exam  Vitals and nursing note reviewed.   Constitutional:       General: She is not in acute distress.     Appearance: She is well-developed.   HENT:      Head: Normocephalic and atraumatic.   Cardiovascular:      Rate and Rhythm: Normal rate and regular rhythm.      Heart sounds: No murmur heard.  Pulmonary:      Effort: Pulmonary effort is normal.      Breath sounds: Normal breath sounds. No wheezing.   Musculoskeletal:         General: Normal range of motion.   Skin:     General: Skin is warm and dry.      Findings: No rash.      Comments: Right posterior lower leg with a tender 3.5 by 4 cm red somewhat raised patch rough in the middle.    Neurological:      Mental Status: She is alert and oriented to person, place, and time.       Lab Results   Component Value Date    CHLPL 198 2022    TRIG 314 (H) 2022    HDL 36 (L) 2022     (H) 2022    VLDL 54 (H) 2022    HGBA1C 6.2 (H) 2022            HEALTH RISK ASSESSMENT    Smoking Status:  Social History     Tobacco Use   Smoking Status Never Smoker   Smokeless Tobacco Never Used     Alcohol Consumption:  Social  History     Substance and Sexual Activity   Alcohol Use No     Fall Risk Screen:    ALEXADI Fall Risk Assessment was completed, and patient is at LOW risk for falls.Assessment completed on:9/12/2022    Depression Screening:  PHQ-2/PHQ-9 Depression Screening 9/12/2022   Retired PHQ-9 Total Score -   Retired Total Score -   Little Interest or Pleasure in Doing Things 0-->not at all   Feeling Down, Depressed or Hopeless 0-->not at all   PHQ-9: Brief Depression Severity Measure Score 0       Health Habits and Functional and Cognitive Screening:  Functional & Cognitive Status 9/12/2022   Do you have difficulty preparing food and eating? No   Do you have difficulty bathing yourself, getting dressed or grooming yourself? No   Do you have difficulty using the toilet? No   Do you have difficulty moving around from place to place? No   Do you have trouble with steps or getting out of a bed or a chair? No   Current Diet Unhealthy Diet   Dental Exam Up to date        Dental Exam Comment -   Eye Exam Not up to date        Eye Exam Comment -   Exercise (times per week) 5 times per week   Current Exercises Include House Cleaning   Do you need help using the phone?  No   Are you deaf or do you have serious difficulty hearing?  No   Do you need help with transportation? No   Do you need help shopping? No   Do you need help preparing meals?  No   Do you need help with housework?  No   Do you need help with laundry? No   Do you need help taking your medications? No   Do you need help managing money? No   Do you ever drive or ride in a car without wearing a seat belt? No   Have you felt unusual stress, anger or loneliness in the last month? No   Who do you live with? Spouse   If you need help, do you have trouble finding someone available to you? No   Have you been bothered in the last four weeks by sexual problems? No   Do you have difficulty concentrating, remembering or making decisions? Yes       Age-appropriate Screening  Schedule:  Refer to the list below for future screening recommendations based on patient's age, sex and/or medical conditions. Orders for these recommended tests are listed in the plan section. The patient has been provided with a written plan.    Health Maintenance   Topic Date Due   • ZOSTER VACCINE (1 of 2) Never done   • TDAP/TD VACCINES (1 - Tdap) 07/03/2023 (Originally 3/8/1964)   • INFLUENZA VACCINE  10/01/2022   • LIPID PANEL  09/06/2023   • DXA SCAN  12/20/2023              Assessment & Plan   CMS Preventative Services Quick Reference  Risk Factors Identified During Encounter  Immunizations Discussed/Encouraged (specific Immunizations; Influenza, Prevnar 20 (Pneumococcal 20-valent conjugate), Shingrix and COVID19  Obesity/Overweight   The above risks/problems have been discussed with the patient.  Follow up actions/plans if indicated are seen below in the Assessment/Plan Section.  Pertinent information has been shared with the patient in the After Visit Summary.    Diagnoses and all orders for this visit:    1. Encounter for subsequent annual wellness visit in Medicare patient (Primary)    2. Primary hypertension    3. Chronic renal impairment, stage 3b (HCC)    4. Mixed hyperlipidemia  -     Lipid Panel; Future  -     Comprehensive Metabolic Panel; Future    5. Class 1 obesity due to excess calories with serious comorbidity and body mass index (BMI) of 33.0 to 33.9 in adult  Assessment & Plan:  Patient's (Body mass index is 33.25 kg/m².) indicates that they are obese (BMI >30) with health conditions that include hypertension . Weight is unchanged. BMI is is above average; BMI management plan is completed. We discussed portion control and increasing exercise.       6. Prediabetes  -     Hemoglobin A1c; Future  -     Comprehensive Metabolic Panel; Future    7. Memory change    8. Erythema nodosum  -     acetaminophen (Tylenol 8 Hour Arthritis Pain) 650 MG 8 hr tablet; Take 2 tablets by mouth Every 8 (Eight)  Hours As Needed for Mild Pain or Moderate Pain.  Dispense: 60 tablet; Refill: 3    9. Vitamin D deficiency  -     Vitamin D 25 hydroxy; Future    The patient was counseled regarding nutrition, physical activity, healthy weight, injury prevention, immunizations and preventative health screenings.    Prediabetes, A1c has increased somewhat over the past year, currently 6.2.  Stressed need for low concentrated sweets diet and increase physical activity and weight reduction to reduce chance of progression to diabetes.    Hyperlipidemia acceptable but not ideal, discussed changing from Pravachol to Crestor or Lipitor.  Patient reports she has had myalgias on these as well as simvastatin and does not want to change medication at this time.  Stressed need to follow a low-cholesterol diet and increase physical activity and work on weight reduction.  We will recheck in 3 months if not improved may consider change at that time.    Hypertension at goal on Toprol and lisinopril, continue    Osteopenia, patient is currently taking calcium 600 mg daily with 800 IUs of vitamin D, 1 tablet daily.  Advised to increase to 2 daily to get a couple of 1200 mg of calcium daily.  Vitamin D level in February was 43.    Erythema nodosum with active lesion new problem to myself.  Discussed typical treatment would be anti-inflammatories or steroids however with reduced renal function advised against anti-inflammatories recommend Tylenol.  Advised this is typically self-limited and should continue to improve over the next couple of weeks.    Follow Up:   Return in about 3 months (around 12/12/2022) for Recheck elevated a1c, leg lesion , chol etc.     An After Visit Summary and PPPS were made available to the patient.

## 2022-11-16 ENCOUNTER — OFFICE VISIT (OUTPATIENT)
Dept: FAMILY MEDICINE CLINIC | Facility: CLINIC | Age: 77
End: 2022-11-16

## 2022-11-16 VITALS
BODY MASS INDEX: 33.27 KG/M2 | DIASTOLIC BLOOD PRESSURE: 82 MMHG | OXYGEN SATURATION: 96 % | HEIGHT: 66 IN | HEART RATE: 69 BPM | WEIGHT: 207 LBS | RESPIRATION RATE: 16 BRPM | TEMPERATURE: 97.1 F | SYSTOLIC BLOOD PRESSURE: 134 MMHG

## 2022-11-16 DIAGNOSIS — L03.115 CELLULITIS OF RIGHT LOWER EXTREMITY: Primary | ICD-10-CM

## 2022-11-16 PROCEDURE — 99213 OFFICE O/P EST LOW 20 MIN: CPT | Performed by: NURSE PRACTITIONER

## 2022-11-16 RX ORDER — CEPHALEXIN 500 MG/1
500 CAPSULE ORAL EVERY 8 HOURS
Qty: 21 CAPSULE | Refills: 0 | Status: SHIPPED | OUTPATIENT
Start: 2022-11-16 | End: 2022-11-23

## 2022-11-16 NOTE — PROGRESS NOTES
Chief Complaint  Rash    Subjective          Anjana Martínez presents to [facility] for     History of Present Illness  Venecia is a 77-year-old female.    Redness on right lower leg for several months. She saw Dr. Montes De Oca on 9/12/22 for the lesion on her leg.  She is here today because she feels the redness is worse and is now painful.   The pain is a throbbing pain at night.  Warm and firm to touch  No blisters, no drainage  She has applied OTC hydrocortisone cream without relief.  She has applied OTC neosporin without relief.    No hx of blood clots.  No recent car or plane rides        Anjana Martínez  has a past medical history of Hyperlipidemia, Hypertension, Obesity, and Osteopenia.      Review of Systems   Constitutional: Negative for fever.   Respiratory: Negative for cough and shortness of breath.    Cardiovascular: Negative for chest pain, palpitations and leg swelling.   Gastrointestinal: Negative for abdominal pain, nausea and vomiting.   Skin: Positive for color change and rash.        Family History   Problem Relation Age of Onset   • Cirrhosis Mother    • Breast cancer Mother    • Cancer Mother    • Heart attack Father    • Cirrhosis Brother    • No Known Problems Daughter    • No Known Problems Brother    • No Known Problems Daughter         Past Surgical History:   Procedure Laterality Date   • COLONOSCOPY     • PACEMAKER IMPLANTATION          Social History     Socioeconomic History   • Marital status:    Tobacco Use   • Smoking status: Never   • Smokeless tobacco: Never   Vaping Use   • Vaping Use: Never used   Substance and Sexual Activity   • Alcohol use: No   • Drug use: No   • Sexual activity: Not Currently     Partners: Male        Objective       Current Outpatient Medications:   •  acetaminophen (Tylenol 8 Hour Arthritis Pain) 650 MG 8 hr tablet, Take 2 tablets by mouth Every 8 (Eight) Hours As Needed for Mild Pain or Moderate Pain., Disp: 60 tablet,  "Rfl: 3  •  calcium gluconate 500 MG tablet, Take 1 tablet by mouth 2 (Two) Times a Day., Disp: 180 tablet, Rfl: 3  •  Cholecalciferol 50 MCG (2000 UT) capsule, [facility] D3 2000 UNIT CAPS, Disp: , Rfl:   •  fluticasone (Flonase) 50 MCG/ACT nasal spray, 2 sprays into the nostril(s) as directed by provider Daily., Disp: 32 g, Rfl: 3  •  lisinopril (PRINIVIL,ZESTRIL) 20 MG tablet, Take 1 tablet by mouth Daily., Disp: 90 tablet, Rfl: 3  •  metoprolol succinate XL (TOPROL-XL) 25 MG 24 hr tablet, Take 25 mg by mouth Daily., Disp: , Rfl:   •  pravastatin (PRAVACHOL) 20 MG tablet, Take 1 tablet by mouth Every Night., Disp: 90 tablet, Rfl: 3  •  cephalexin (KEFLEX) 500 MG capsule, Take 1 capsule by mouth Every 8 (Eight) Hours for 7 days., Disp: 21 capsule, Rfl: 0    Vital Signs:      /82 (BP Location: Right arm, Patient Position: Sitting, Cuff Size: Adult)   Pulse 69   Temp 97.1 °F (36.2 °C) (Infrared)   Resp 16   Ht 167.6 cm (66\")   Wt 93.9 kg (207 lb)   SpO2 96%   BMI 33.41 kg/m²     Vitals:    11/16/22 1038 11/16/22 1041   BP: 144/80 134/82   BP Location: Right arm Right arm   Patient Position: Sitting Sitting   Cuff Size: Adult Adult   Pulse: 69    Resp: 16    Temp: 97.1 °F (36.2 °C)    TempSrc: Infrared    SpO2: 96%    Weight: 93.9 kg (207 lb)    Height: 167.6 cm (66\")       Physical Exam  Vitals reviewed.   Constitutional:       General: She is not in acute distress.     Appearance: Normal appearance. She is obese. She is not ill-appearing.   HENT:      Head: Normocephalic and atraumatic.   Eyes:      General: No scleral icterus.     Conjunctiva/sclera: Conjunctivae normal.   Cardiovascular:      Rate and Rhythm: Regular rhythm.      Pulses:           Dorsalis pedis pulses are 2+ on the right side.        Posterior tibial pulses are 2+ on the right side.      Heart sounds: Normal heart sounds.   Pulmonary:      Effort: Pulmonary effort is normal.      Breath sounds: Normal breath sounds.   Musculoskeletal:      " Right lower leg: No edema.      Left lower leg: No edema.   Skin:     General: Skin is warm and dry.      Capillary Refill: Capillary refill takes less than 2 seconds.      Comments: Approximately 2 inch x 1.5 inch oval red, warm to touch, firm area on right lower leg (See picture in media section of chart).   Neurological:      Mental Status: She is alert.   Psychiatric:         Mood and Affect: Mood normal.         Behavior: Behavior normal.        Result Review :                PHQ-9 Total Score: 0               Assessment and Plan    Diagnoses and all orders for this visit:    1. Cellulitis of right lower extremity (Primary)  -     cephalexin (KEFLEX) 500 MG capsule; Take 1 capsule by mouth Every 8 (Eight) Hours for 7 days.  Dispense: 21 capsule; Refill: 0       Begin antibiotic Keflex.  Cellulitis patient instructions provided.  Follow-up with Dr. Montes De Oca as scheduled on 12/14/22 or sooner if needed.      Follow Up   Return if symptoms worsen or fail to improve.  Patient was given instructions and counseling regarding her condition or for health maintenance advice. Please see specific information pulled into the AVS if appropriate.    There are no Patient Instructions on file for this visit.

## 2022-11-30 ENCOUNTER — TRANSCRIBE ORDERS (OUTPATIENT)
Dept: ADMINISTRATIVE | Facility: HOSPITAL | Age: 77
End: 2022-11-30

## 2022-11-30 DIAGNOSIS — Z12.31 SCREENING MAMMOGRAM FOR HIGH-RISK PATIENT: Primary | ICD-10-CM

## 2022-12-07 LAB
25(OH)D3+25(OH)D2 SERPL-MCNC: 39.7 NG/ML (ref 30–100)
ALBUMIN SERPL-MCNC: 4.6 G/DL (ref 3.7–4.7)
ALBUMIN/GLOB SERPL: 1.8 {RATIO} (ref 1.2–2.2)
ALP SERPL-CCNC: 67 IU/L (ref 44–121)
ALT SERPL-CCNC: 23 IU/L (ref 0–32)
AST SERPL-CCNC: 16 IU/L (ref 0–40)
BILIRUB SERPL-MCNC: 0.4 MG/DL (ref 0–1.2)
BUN SERPL-MCNC: 28 MG/DL (ref 8–27)
BUN/CREAT SERPL: 20 (ref 12–28)
CALCIUM SERPL-MCNC: 9.9 MG/DL (ref 8.7–10.3)
CHLORIDE SERPL-SCNC: 108 MMOL/L (ref 96–106)
CO2 SERPL-SCNC: 20 MMOL/L (ref 20–29)
CREAT SERPL-MCNC: 1.38 MG/DL (ref 0.57–1)
EGFRCR SERPLBLD CKD-EPI 2021: 40 ML/MIN/1.73
GLOBULIN SER CALC-MCNC: 2.6 G/DL (ref 1.5–4.5)
GLUCOSE SERPL-MCNC: 109 MG/DL (ref 70–99)
HBA1C MFR BLD: 5.9 % (ref 4.8–5.6)
POTASSIUM SERPL-SCNC: 4.9 MMOL/L (ref 3.5–5.2)
PROT SERPL-MCNC: 7.2 G/DL (ref 6–8.5)
SODIUM SERPL-SCNC: 143 MMOL/L (ref 134–144)

## 2022-12-14 ENCOUNTER — OFFICE VISIT (OUTPATIENT)
Dept: FAMILY MEDICINE CLINIC | Facility: CLINIC | Age: 77
End: 2022-12-14
Payer: MEDICARE

## 2022-12-14 VITALS
OXYGEN SATURATION: 95 % | RESPIRATION RATE: 18 BRPM | WEIGHT: 200.6 LBS | HEART RATE: 87 BPM | TEMPERATURE: 97.5 F | HEIGHT: 66 IN | BODY MASS INDEX: 32.24 KG/M2 | SYSTOLIC BLOOD PRESSURE: 132 MMHG | DIASTOLIC BLOOD PRESSURE: 76 MMHG

## 2022-12-14 DIAGNOSIS — M79.604 RIGHT LEG PAIN: ICD-10-CM

## 2022-12-14 DIAGNOSIS — M54.32 SCIATICA OF LEFT SIDE: ICD-10-CM

## 2022-12-14 DIAGNOSIS — E66.09 CLASS 1 OBESITY DUE TO EXCESS CALORIES WITH SERIOUS COMORBIDITY AND BODY MASS INDEX (BMI) OF 33.0 TO 33.9 IN ADULT: ICD-10-CM

## 2022-12-14 DIAGNOSIS — R73.03 PREDIABETES: Primary | ICD-10-CM

## 2022-12-14 DIAGNOSIS — M79.89 RIGHT LEG SWELLING: ICD-10-CM

## 2022-12-14 DIAGNOSIS — N18.32 CHRONIC RENAL IMPAIRMENT, STAGE 3B: ICD-10-CM

## 2022-12-14 DIAGNOSIS — L03.115 CELLULITIS OF RIGHT LOWER EXTREMITY: ICD-10-CM

## 2022-12-14 PROCEDURE — 3078F DIAST BP <80 MM HG: CPT | Performed by: FAMILY MEDICINE

## 2022-12-14 PROCEDURE — 1160F RVW MEDS BY RX/DR IN RCRD: CPT | Performed by: FAMILY MEDICINE

## 2022-12-14 PROCEDURE — 99214 OFFICE O/P EST MOD 30 MIN: CPT | Performed by: FAMILY MEDICINE

## 2022-12-14 PROCEDURE — 1159F MED LIST DOCD IN RCRD: CPT | Performed by: FAMILY MEDICINE

## 2022-12-14 PROCEDURE — 3075F SYST BP GE 130 - 139MM HG: CPT | Performed by: FAMILY MEDICINE

## 2022-12-14 RX ORDER — TRAMADOL HYDROCHLORIDE 50 MG/1
TABLET ORAL
Qty: 30 TABLET | Refills: 1 | Status: SHIPPED | OUTPATIENT
Start: 2022-12-14

## 2022-12-14 RX ORDER — PREDNISONE 20 MG/1
40 TABLET ORAL DAILY
Qty: 10 TABLET | Refills: 0 | Status: SHIPPED | OUTPATIENT
Start: 2022-12-14 | End: 2023-01-06

## 2022-12-14 NOTE — PROGRESS NOTES
Chief Complaint  Prediabetes and Leg Pain    History of Present Illness  Anjana Martínez presents today for follow up on elevated a1c and lesion leg  Patient does not check blood sugar at home.  Associated symptoms include numbness and tingling in feet and toes.  Per patient current diet is well balanced diet.  Patient does avoid concentrated sweets.     Patient is also having left hip and thigh pain. The pain starts in gluteal muscles and goes down thigh behind knee. Started at least a month ago and worse over the past 2 weeks.  The pain is described as achy. Patient takes tylenol 650 mg but this is not helping the pain. She states she didn't think she could take ibuprofen for this due to her kidneys.   Rates pain as 3 of 10 (may have overstated earlier)  Right leg cellulitis. Redness around the area and pain are present. Patient states she elevates leg and wears compression socks to help with this. She seen Vonnie Zhao 1 month ago.  This was treated with keflex 3 times daily for 1 week. She didn't feel this helped it much. Vonnie Zhao did save a picture of this into the media. Looking today the spot looks the same.     Current Outpatient Medications on File Prior to Visit   Medication Sig   • acetaminophen (Tylenol 8 Hour Arthritis Pain) 650 MG 8 hr tablet Take 2 tablets by mouth Every 8 (Eight) Hours As Needed for Mild Pain or Moderate Pain.   • calcium gluconate 500 MG tablet Take 1 tablet by mouth 2 (Two) Times a Day.   • Cholecalciferol 50 MCG (2000 UT) capsule [facility] D3 2000 UNIT CAPS   • fluticasone (Flonase) 50 MCG/ACT nasal spray 2 sprays into the nostril(s) as directed by provider Daily.   • lisinopril (PRINIVIL,ZESTRIL) 20 MG tablet Take 1 tablet by mouth Daily.   • metoprolol succinate XL (TOPROL-XL) 25 MG 24 hr tablet Take 25 mg by mouth Daily.     No current facility-administered medications on file prior to visit.       Objective   Vital Signs:   /76 (BP Location: Right arm, Patient  Position: Sitting, Cuff Size: Adult)   Pulse 87   Temp 97.5 °F (36.4 °C) (Temporal)   Resp 18   Ht 167.6 cm (66\")   Wt 91 kg (200 lb 9.6 oz)   SpO2 95%   BMI 32.38 kg/m²       Physical Exam  Vitals and nursing note reviewed.   Constitutional:       General: She is not in acute distress.     Appearance: She is well-developed. She is obese. She is not ill-appearing.   HENT:      Head: Normocephalic and atraumatic.   Cardiovascular:      Rate and Rhythm: Normal rate and regular rhythm.      Heart sounds: No murmur heard.  Pulmonary:      Effort: Pulmonary effort is normal.      Breath sounds: Normal breath sounds. No wheezing.   Musculoskeletal:         General: Normal range of motion.   Skin:     General: Skin is warm and dry.      Findings: No rash.      Comments: Right posterior lower leg with a tender 3.5 by 4 cm red somewhat raised patch rough in the middle.    Neurological:      Mental Status: She is alert and oriented to person, place, and time.            No visits with results within 1 Day(s) from this visit.   Latest known visit with results is:   Orders Only on 12/06/2022   Component Date Value Ref Range Status   • Glucose 12/06/2022 109 (H)  70 - 99 mg/dL Final   • BUN 12/06/2022 28 (H)  8 - 27 mg/dL Final   • Creatinine 12/06/2022 1.38 (H)  0.57 - 1.00 mg/dL Final   • EGFR Result 12/06/2022 40 (L)  >59 mL/min/1.73 Final   • BUN/Creatinine Ratio 12/06/2022 20  12 - 28 Final   • Sodium 12/06/2022 143  134 - 144 mmol/L Final   • Potassium 12/06/2022 4.9  3.5 - 5.2 mmol/L Final   • Chloride 12/06/2022 108 (H)  96 - 106 mmol/L Final   • Total CO2 12/06/2022 20  20 - 29 mmol/L Final   • Calcium 12/06/2022 9.9  8.7 - 10.3 mg/dL Final   • Total Protein 12/06/2022 7.2  6.0 - 8.5 g/dL Final   • Albumin 12/06/2022 4.6  3.7 - 4.7 g/dL Final   • Globulin 12/06/2022 2.6  1.5 - 4.5 g/dL Final   • A/G Ratio 12/06/2022 1.8  1.2 - 2.2 Final   • Total Bilirubin 12/06/2022 0.4  0.0 - 1.2 mg/dL Final   • Alkaline  Phosphatase 12/06/2022 67  44 - 121 IU/L Final   • AST (SGOT) 12/06/2022 16  0 - 40 IU/L Final   • ALT (SGPT) 12/06/2022 23  0 - 32 IU/L Final   • Hemoglobin A1C 12/06/2022 5.9 (H)  4.8 - 5.6 % Final    Comment:          Prediabetes: 5.7 - 6.4           Diabetes: >6.4           Glycemic control for adults with diabetes: <7.0     • 25 Hydroxy, Vitamin D 12/06/2022 39.7  30.0 - 100.0 ng/mL Final    Comment: Vitamin D deficiency has been defined by the [place] of  Medicine and an Endocrine Society practice guideline as a  level of serum 25-OH vitamin D less than 20 ng/mL (1,2).  The Endocrine Society went on to further define vitamin D  insufficiency as a level between 21 and 29 ng/mL (2).  1. IOM ([place] of [employer]). 2010. Dietary reference     intakes for calcium and D. [place]: The     National Academies Press.  2. Muna MF, Melly SALAZAR, Blair DAMICO, et al.     Evaluation, treatment, and prevention of vitamin D     deficiency: an Endocrine Society clinical practice     guideline. JCEM. 2011 Jul; 96(7):1911-30.       A1C Last 3 Results    HGBA1C Last 3 Results 9/6/22 12/6/22   Hemoglobin A1C 6.2 (A) 5.9 (A)   (A) Abnormal value       Comments are available for some flowsheets but are not being displayed.           Lab Results   Component Value Date    CHOL 255 (H) 04/04/2019    CHLPL 198 09/06/2022    TRIG 314 (H) 09/06/2022    HDL 36 (L) 09/06/2022     (H) 09/06/2022     Lab Results   Component Value Date    TSH 4.420 05/11/2021     Lab Results   Component Value Date    GLUCOSE 109 (H) 12/06/2022    BUN 28 (H) 12/06/2022    CREATININE 1.38 (H) 12/06/2022    EGFRIFNONA 41 (L) 08/31/2021    EGFRIFAFRI 47 (L) 08/31/2021    BCR 20 12/06/2022    K 4.9 12/06/2022    CO2 20 12/06/2022    CALCIUM 9.9 12/06/2022    PROTENTOTREF 7.2 12/06/2022    ALBUMIN 4.6 12/06/2022    LABIL2 1.8 12/06/2022    AST 16 12/06/2022    ALT 23 12/06/2022     Lab Results   Component Value Date    WBC 6.9 02/28/2022     HGB 13.4 02/28/2022    HCT 41.2 02/28/2022    MCV 88 02/28/2022     02/28/2022                      Assessment and Plan    Diagnoses and all orders for this visit:    1. Prediabetes (Primary)    2. Cellulitis of right lower extremity  -     dicloxacillin (DYNAPEN) 500 MG capsule; Take 1 capsule by mouth 4 (Four) Times a Day for 10 days.  Dispense: 40 capsule; Refill: 0    3. Right leg pain    4. Right leg swelling    5. Sciatica of left side  -     predniSONE (DELTASONE) 20 MG tablet; Take 2 tablets by mouth Daily.  Dispense: 10 tablet; Refill: 0  -     traMADol (ULTRAM) 50 MG tablet; 1-2 every 8 hours if needed  Dispense: 30 tablet; Refill: 1  -     Ambulatory Referral to Physical Therapy Evaluate and treat    6. Class 1 obesity due to excess calories with serious comorbidity and body mass index (BMI) of 33.0 to 33.9 in adult  Assessment & Plan:  Patient's (Body mass index is 32.38 kg/m².) indicates that they are obese (BMI >30) with health conditions that include hypertension and dyslipidemias . Weight is unchanged. BMI is is above average; BMI management plan is completed. We discussed portion control and increasing exercise.       7. Chronic renal impairment, stage 3b (HCC)    Prediabetes, improvement in A1c, continue low concentrated sweets diet.    Right leg pain and cellulitis, not improved patient was treated with Keflex for 1 week in November.  Due to significant varicosities in the area, delayed healing may be due to poor blood flow.  Prescription for dicloxacillin for 10 days, continue mild compression and recommend elevation 10 to 15 minutes 3-4 times a day to help with edema.  If does not resolve, may need referral to Derm and/or wound management.    Left-sided sciatica, prescription for prednisone and tramadol as well as referral to physical therapy.  Did discuss steroid therapy could raise blood sugar levels and should be more diligent in with dietary management of glucose.    There are no  discontinued medications.      Follow Up     No follow-ups on file.    Patient was given instructions and counseling regarding her condition or for health maintenance advice. Please see specific information pulled into the AVS if appropriate.

## 2022-12-14 NOTE — ASSESSMENT & PLAN NOTE
Patient's (Body mass index is 32.38 kg/m².) indicates that they are obese (BMI >30) with health conditions that include hypertension and dyslipidemias . Weight is unchanged. BMI is is above average; BMI management plan is completed. We discussed portion control and increasing exercise.

## 2022-12-21 ENCOUNTER — APPOINTMENT (OUTPATIENT)
Dept: MAMMOGRAPHY | Facility: HOSPITAL | Age: 77
End: 2022-12-21

## 2022-12-29 ENCOUNTER — TELEPHONE (OUTPATIENT)
Dept: FAMILY MEDICINE CLINIC | Facility: CLINIC | Age: 77
End: 2022-12-29

## 2022-12-29 DIAGNOSIS — E78.2 MIXED HYPERLIPIDEMIA: ICD-10-CM

## 2022-12-29 RX ORDER — PRAVASTATIN SODIUM 20 MG
20 TABLET ORAL NIGHTLY
Qty: 90 TABLET | Refills: 3 | Status: CANCELLED | OUTPATIENT
Start: 2022-12-29

## 2022-12-29 NOTE — TELEPHONE ENCOUNTER
Incoming Refill Request      Medication requested (name and dose):   pravastatin (PRAVACHOL) 20 MG tablet  20 mg, Nightly         Pharmacy where request should be sent: [facility] - [place] [id number] [facility] - [phone]  - [phone]   [phone]    Additional details provided by patient: NONE    Best call back number: [phone]    Does the patient have less than a 3 day supply:  [] Yes  [x] No    Blanca Weiss Rep  12/29/22, 15:13 EST

## 2022-12-30 DIAGNOSIS — E78.2 MIXED HYPERLIPIDEMIA: ICD-10-CM

## 2022-12-30 RX ORDER — PRAVASTATIN SODIUM 20 MG
20 TABLET ORAL NIGHTLY
Qty: 90 TABLET | Refills: 1 | Status: SHIPPED | OUTPATIENT
Start: 2022-12-30 | End: 2022-12-30 | Stop reason: SDUPTHER

## 2022-12-30 RX ORDER — PRAVASTATIN SODIUM 20 MG
20 TABLET ORAL NIGHTLY
Qty: 90 TABLET | Refills: 1 | Status: SHIPPED | OUTPATIENT
Start: 2022-12-30 | End: 2023-02-22

## 2023-01-03 ENCOUNTER — TELEPHONE (OUTPATIENT)
Dept: FAMILY MEDICINE CLINIC | Facility: CLINIC | Age: 78
End: 2023-01-03

## 2023-01-03 NOTE — TELEPHONE ENCOUNTER
Called patient she was on abx for cellulitis, patient states that it is not improved much and didn't know if we could just call in another abx. I did make patient appt due to cancellation on schedule for Friday. Told patient that if she did not need to be re evaluated that I would call her and let her know.

## 2023-01-03 NOTE — TELEPHONE ENCOUNTER
Mrs elizabeth called and stated that she had crecilius and was on meds for 10 days, she's out of meds but want to see if she can be seen and get called in some more meds if possible

## 2023-01-03 NOTE — TELEPHONE ENCOUNTER
Patient was treated with Keflex in November and dicloxacillin 3 weeks ago, if the area has not improved with 2 full courses of antibiotics, I do recommend she come into the office for recheck.  May need referral to dermatology, please ask if she is having any change in the symptoms i.e. any skin breakdown or increased in area of redness or pain?

## 2023-01-04 NOTE — TELEPHONE ENCOUNTER
Called patient she is not having any skin breakdown. Patient states that the cellulitis is the same as prior to starting the abx.   Patient has appt on Friday

## 2023-01-06 ENCOUNTER — HOSPITAL ENCOUNTER (OUTPATIENT)
Dept: CARDIOLOGY | Facility: HOSPITAL | Age: 78
Discharge: HOME | End: 2023-01-06
Payer: MEDICARE

## 2023-01-06 ENCOUNTER — OFFICE VISIT (OUTPATIENT)
Dept: FAMILY MEDICINE CLINIC | Facility: CLINIC | Age: 78
End: 2023-01-06
Payer: MEDICARE

## 2023-01-06 ENCOUNTER — HOSPITAL ENCOUNTER (OUTPATIENT)
Dept: MAMMOGRAPHY | Facility: HOSPITAL | Age: 78
Discharge: HOME | End: 2023-01-06
Payer: MEDICARE

## 2023-01-06 VITALS
WEIGHT: 201.5 LBS | OXYGEN SATURATION: 98 % | RESPIRATION RATE: 18 BRPM | SYSTOLIC BLOOD PRESSURE: 126 MMHG | BODY MASS INDEX: 32.38 KG/M2 | DIASTOLIC BLOOD PRESSURE: 68 MMHG | TEMPERATURE: 97.7 F | HEART RATE: 97 BPM | HEIGHT: 66 IN

## 2023-01-06 DIAGNOSIS — E66.09 CLASS 1 OBESITY DUE TO EXCESS CALORIES WITH SERIOUS COMORBIDITY AND BODY MASS INDEX (BMI) OF 32.0 TO 32.9 IN ADULT: ICD-10-CM

## 2023-01-06 DIAGNOSIS — I80.01 THROMBOPHLEBITIS OF SUPERFICIAL VEINS OF RIGHT LOWER EXTREMITY: Primary | ICD-10-CM

## 2023-01-06 DIAGNOSIS — Z12.31 SCREENING MAMMOGRAM FOR HIGH-RISK PATIENT: ICD-10-CM

## 2023-01-06 PROBLEM — L03.115 CELLULITIS OF RIGHT LOWER EXTREMITY: Status: ACTIVE | Noted: 2023-01-06

## 2023-01-06 LAB
BH CV LOWER VASCULAR LEFT COMMON FEMORAL AUGMENT: NORMAL
BH CV LOWER VASCULAR LEFT COMMON FEMORAL COMPETENT: NORMAL
BH CV LOWER VASCULAR LEFT COMMON FEMORAL COMPRESS: NORMAL
BH CV LOWER VASCULAR LEFT COMMON FEMORAL PHASIC: NORMAL
BH CV LOWER VASCULAR LEFT COMMON FEMORAL SPONT: NORMAL
BH CV LOWER VASCULAR RIGHT COMMON FEMORAL AUGMENT: NORMAL
BH CV LOWER VASCULAR RIGHT COMMON FEMORAL COMPETENT: NORMAL
BH CV LOWER VASCULAR RIGHT COMMON FEMORAL COMPRESS: NORMAL
BH CV LOWER VASCULAR RIGHT COMMON FEMORAL PHASIC: NORMAL
BH CV LOWER VASCULAR RIGHT COMMON FEMORAL SPONT: NORMAL
BH CV LOWER VASCULAR RIGHT DISTAL FEMORAL COMPRESS: NORMAL
BH CV LOWER VASCULAR RIGHT GASTRONEMIUS COMPRESS: NORMAL
BH CV LOWER VASCULAR RIGHT GREATER SAPH AK COMPRESS: NORMAL
BH CV LOWER VASCULAR RIGHT GREATER SAPH BK COMPRESS: NORMAL
BH CV LOWER VASCULAR RIGHT LESSER SAPH COMPRESS: NORMAL
BH CV LOWER VASCULAR RIGHT MID FEMORAL AUGMENT: NORMAL
BH CV LOWER VASCULAR RIGHT MID FEMORAL COMPETENT: NORMAL
BH CV LOWER VASCULAR RIGHT MID FEMORAL COMPRESS: NORMAL
BH CV LOWER VASCULAR RIGHT MID FEMORAL PHASIC: NORMAL
BH CV LOWER VASCULAR RIGHT MID FEMORAL SPONT: NORMAL
BH CV LOWER VASCULAR RIGHT PERONEAL COMPRESS: NORMAL
BH CV LOWER VASCULAR RIGHT POPLITEAL AUGMENT: NORMAL
BH CV LOWER VASCULAR RIGHT POPLITEAL COMPETENT: NORMAL
BH CV LOWER VASCULAR RIGHT POPLITEAL COMPRESS: NORMAL
BH CV LOWER VASCULAR RIGHT POPLITEAL PHASIC: NORMAL
BH CV LOWER VASCULAR RIGHT POPLITEAL SPONT: NORMAL
BH CV LOWER VASCULAR RIGHT POSTERIOR TIBIAL COMPRESS: NORMAL
BH CV LOWER VASCULAR RIGHT PROXIMAL FEMORAL COMPRESS: NORMAL
BH CV LOWER VASCULAR RIGHT SAPHENOFEMORAL JUNCTION COMPRESS: NORMAL
BH CV VAS PRELIMINARY FINDINGS SCRIPTING: 1
MAXIMAL PREDICTED HEART RATE: 145 BPM
STRESS TARGET HR: 123 BPM

## 2023-01-06 PROCEDURE — 77063 BREAST TOMOSYNTHESIS BI: CPT

## 2023-01-06 PROCEDURE — 77067 SCR MAMMO BI INCL CAD: CPT

## 2023-01-06 PROCEDURE — 93971 EXTREMITY STUDY: CPT

## 2023-01-06 PROCEDURE — 99214 OFFICE O/P EST MOD 30 MIN: CPT | Performed by: FAMILY MEDICINE

## 2023-01-06 RX ORDER — NAPROXEN 500 MG/1
TABLET ORAL
Qty: 30 TABLET | Refills: 0 | Status: SHIPPED | OUTPATIENT
Start: 2023-01-06

## 2023-01-06 NOTE — PROGRESS NOTES
Chief Complaint  Cellulitis   Answers for HPI/ROS submitted by the patient on 1/4/2023  Please describe your symptoms.: Check leg after 10 days of antibiotics.  Have you had these symptoms before?: Yes  How long have you been having these symptoms?: Greater than 2 weeks  Please list any medications you are currently taking for this condition.: None  Please describe any probable cause for these symptoms. : Overweight. Not enough exercise?  What is the primary reason for your visit?: Other      History of Present Illness  Anjana Martínez presents today for cellulitis.   Cellulitis present for greater than 2 weeks.   Treatments tried dicloxacillin and Keflex.   Patient states that the area has became painful.   Patient was treated with Keflex in November and dicloxacillin 3 weeks ago, if the area has not improved with 2 full courses of antibiotics, I do recommend she come into the office for recheck.  May need referral to dermatology, please ask if she is having any change in the symptoms i.e. any skin breakdown or increased in area of redness or pain?      Current Outpatient Medications on File Prior to Visit   Medication Sig   • acetaminophen (Tylenol 8 Hour Arthritis Pain) 650 MG 8 hr tablet Take 2 tablets by mouth Every 8 (Eight) Hours As Needed for Mild Pain or Moderate Pain.   • calcium gluconate 500 MG tablet Take 1 tablet by mouth 2 (Two) Times a Day.   • Cholecalciferol 50 MCG (2000 UT) capsule [facility] D3 2000 UNIT CAPS   • fluticasone (Flonase) 50 MCG/ACT nasal spray 2 sprays into the nostril(s) as directed by provider Daily.   • lisinopril (PRINIVIL,ZESTRIL) 20 MG tablet Take 1 tablet by mouth Daily.   • metoprolol succinate XL (TOPROL-XL) 25 MG 24 hr tablet Take 25 mg by mouth Daily.   • pravastatin (PRAVACHOL) 20 MG tablet Take 1 tablet by mouth Every Night.   • traMADol (ULTRAM) 50 MG tablet 1-2 every 8 hours if needed   • [DISCONTINUED] predniSONE (DELTASONE) 20 MG tablet Take 2 tablets by mouth Daily.  Spoke w/pt and put pt on schedule [facility] for tomorrow at 830 for Avani.   Pt requested RTW letter be faxed to pts job.          No current facility-administered medications on file prior to visit.       Objective   Vital Signs:   /68 (BP Location: Right arm, Patient Position: Sitting, Cuff Size: Adult)   Pulse 97   Temp 97.7 °F (36.5 °C) (Temporal)   Resp 18   Ht 167.6 cm (66\")   Wt 91.4 kg (201 lb 8 oz)   SpO2 98% Comment: room air  BMI 32.52 kg/m²       Physical Exam  Vitals and nursing note reviewed.   Constitutional:       General: She is not in acute distress.     Appearance: She is well-developed. She is obese. She is not ill-appearing.   HENT:      Head: Normocephalic and atraumatic.   Cardiovascular:      Rate and Rhythm: Normal rate and regular rhythm.      Heart sounds: No murmur heard.  Pulmonary:      Effort: Pulmonary effort is normal.      Breath sounds: Normal breath sounds. No wheezing.   Musculoskeletal:         General: Normal range of motion.      Comments: 1+ nonpitting edema of lower extremities bilaterally   Skin:     General: Skin is warm and dry.      Findings: No rash.      Comments: Right posteriomedial lower leg with palpable firmness proximal to approximately 3 cm light red patch.  There is no tenderness palpation in the area of erythema.  There is no increased warmth or evidence of skin breakdown  Varicosities are visible bilaterally   Neurological:      Mental Status: She is alert and oriented to person, place, and time.            No visits with results within 1 Day(s) from this visit.   Latest known visit with results is:   Orders Only on 12/06/2022   Component Date Value Ref Range Status   • Glucose 12/06/2022 109 (H)  70 - 99 mg/dL Final   • BUN 12/06/2022 28 (H)  8 - 27 mg/dL Final   • Creatinine 12/06/2022 1.38 (H)  0.57 - 1.00 mg/dL Final   • EGFR Result 12/06/2022 40 (L)  >59 mL/min/1.73 Final   • BUN/Creatinine Ratio 12/06/2022 20  12 - 28 Final   • Sodium 12/06/2022 143  134 - 144 mmol/L Final   • Potassium 12/06/2022 4.9  3.5 - 5.2 mmol/L Final   • Chloride 12/06/2022 108 (H)  96 -  106 mmol/L Final   • Total CO2 12/06/2022 20  20 - 29 mmol/L Final   • Calcium 12/06/2022 9.9  8.7 - 10.3 mg/dL Final   • Total Protein 12/06/2022 7.2  6.0 - 8.5 g/dL Final   • Albumin 12/06/2022 4.6  3.7 - 4.7 g/dL Final   • Globulin 12/06/2022 2.6  1.5 - 4.5 g/dL Final   • A/G Ratio 12/06/2022 1.8  1.2 - 2.2 Final   • Total Bilirubin 12/06/2022 0.4  0.0 - 1.2 mg/dL Final   • Alkaline Phosphatase 12/06/2022 67  44 - 121 IU/L Final   • AST (SGOT) 12/06/2022 16  0 - 40 IU/L Final   • ALT (SGPT) 12/06/2022 23  0 - 32 IU/L Final   • Hemoglobin A1C 12/06/2022 5.9 (H)  4.8 - 5.6 % Final    Comment:          Prediabetes: 5.7 - 6.4           Diabetes: >6.4           Glycemic control for adults with diabetes: <7.0     • 25 Hydroxy, Vitamin D 12/06/2022 39.7  30.0 - 100.0 ng/mL Final    Comment: Vitamin D deficiency has been defined by the [place] of  Medicine and an Endocrine Society practice guideline as a  level of serum 25-OH vitamin D less than 20 ng/mL (1,2).  The Endocrine Society went on to further define vitamin D  insufficiency as a level between 21 and 29 ng/mL (2).  1. IOM ([place] [employer]). 2010. Dietary reference     intakes for calcium and D. [place]: The     National Academies Press.  2. Muna MF, Melly NC, Blair DAMICO, et al.     Evaluation, treatment, and prevention of vitamin D     deficiency: an Endocrine Society clinical practice     guideline. JCEM. 2011 Jul; 96(7):1911-30.       A1C Last 3 Results    HGBA1C Last 3 Results 9/6/22 12/6/22   Hemoglobin A1C 6.2 (A) 5.9 (A)   (A) Abnormal value       Comments are available for some flowsheets but are not being displayed.           Lab Results   Component Value Date    CHOL 255 (H) 04/04/2019    CHLPL 198 09/06/2022    TRIG 314 (H) 09/06/2022    HDL 36 (L) 09/06/2022     (H) 09/06/2022     Lab Results   Component Value Date    TSH 4.420 05/11/2021     Lab Results   Component Value Date    GLUCOSE 109 (H) 12/06/2022    BUN 28  (H) 12/06/2022    CREATININE 1.38 (H) 12/06/2022    EGFRIFNONA 41 (L) 08/31/2021    EGFRIFAFRI 47 (L) 08/31/2021    BCR 20 12/06/2022    K 4.9 12/06/2022    CO2 20 12/06/2022    CALCIUM 9.9 12/06/2022    PROTENTOTREF 7.2 12/06/2022    ALBUMIN 4.6 12/06/2022    LABIL2 1.8 12/06/2022    AST 16 12/06/2022    ALT 23 12/06/2022     Lab Results   Component Value Date    WBC 6.9 02/28/2022    HGB 13.4 02/28/2022    HCT 41.2 02/28/2022    MCV 88 02/28/2022     02/28/2022                      Assessment and Plan    Diagnoses and all orders for this visit:    1. Thrombophlebitis of superficial veins of right lower extremity (Primary)  -     Cancel: US Venous Doppler Lower Extremity Right (duplex); Future  -     naproxen (Naprosyn) 500 MG tablet; 1-2 times daily as needed for pain  Dispense: 30 tablet; Refill: 0  -     Diclofenac Sodium (VOLTAREN) 1 % gel gel; Apply 4 g topically to the appropriate area as directed 4 (Four) Times a Day. 4 grams  Dispense: 300 g; Refill: 1  -     CBC & Differential  -     Protime-INR  -     APTT    2. Class 1 obesity due to excess calories with serious comorbidity and body mass index (BMI) of 32.0 to 32.9 in adult  Assessment & Plan:  Patient's (Body mass index is 32.52 kg/m².) indicates that they are obese (BMI >30) with health conditions that include hypertension and dyslipidemias . Weight is unchanged. BMI is is above average; BMI management plan is completed. We discussed portion control and increasing exercise.     Persistent right leg redness not resolved after 2 courses of antibiotics for cellulitis and pain now with a feeling of firm knots proximal to the red area, likely consistent with a superficial thrombophlebitis.  We will prescribe daily anti-inflammatory to use with caution due to risk of GI bleed and risk to renal function.  Also prescribing topical diclofenac to transition off of oral medication as possible.  She will continue compression hose and elevation to keep  swelling at a minimum.  Ordering a Doppler ultrasound to evaluate for extent of clot burden.  Will check CBC, PT and PTT.  If there is extensive clot may need coagulability labs as well.  Continue antibiotics not likely of benefit at this point.  If Doppler does not confirm superficial thrombophlebitis may need referral to dermatology for biopsy.  Medications Discontinued During This Encounter   Medication Reason   • predniSONE (DELTASONE) 20 MG tablet *Therapy completed         Follow Up     Return in about 2 months (around 3/6/2023) for Follow-up chronic medical conditions with labs prior, or as needed for persistent leg symptoms.    Patient was given instructions and counseling regarding her condition or for health maintenance advice. Please see specific information pulled into the AVS if appropriate.

## 2023-01-06 NOTE — ASSESSMENT & PLAN NOTE
Patient's (Body mass index is 32.52 kg/m².) indicates that they are obese (BMI >30) with health conditions that include hypertension and dyslipidemias . Weight is unchanged. BMI is is above average; BMI management plan is completed. We discussed portion control and increasing exercise.

## 2023-01-07 LAB
APTT PPP: 26 SEC (ref 24–33)
BASOPHILS # BLD AUTO: 0 X10E3/UL (ref 0–0.2)
BASOPHILS NFR BLD AUTO: 1 %
EOSINOPHIL # BLD AUTO: 0.3 X10E3/UL (ref 0–0.4)
EOSINOPHIL NFR BLD AUTO: 5 %
ERYTHROCYTE [DISTWIDTH] IN BLOOD BY AUTOMATED COUNT: 12.9 % (ref 11.7–15.4)
HCT VFR BLD AUTO: 37.1 % (ref 34–46.6)
HGB BLD-MCNC: 12.8 G/DL (ref 11.1–15.9)
IMM GRANULOCYTES # BLD AUTO: 0 X10E3/UL (ref 0–0.1)
IMM GRANULOCYTES NFR BLD AUTO: 1 %
INR PPP: 1 (ref 0.9–1.2)
LYMPHOCYTES # BLD AUTO: 1.7 X10E3/UL (ref 0.7–3.1)
LYMPHOCYTES NFR BLD AUTO: 26 %
MCH RBC QN AUTO: 29.5 PG (ref 26.6–33)
MCHC RBC AUTO-ENTMCNC: 34.5 G/DL (ref 31.5–35.7)
MCV RBC AUTO: 86 FL (ref 79–97)
MONOCYTES # BLD AUTO: 0.5 X10E3/UL (ref 0.1–0.9)
MONOCYTES NFR BLD AUTO: 8 %
NEUTROPHILS # BLD AUTO: 3.9 X10E3/UL (ref 1.4–7)
NEUTROPHILS NFR BLD AUTO: 59 %
PLATELET # BLD AUTO: 235 X10E3/UL (ref 150–450)
PROTHROMBIN TIME: 10.6 SEC (ref 9.1–12)
RBC # BLD AUTO: 4.34 X10E6/UL (ref 3.77–5.28)
WBC # BLD AUTO: 6.5 X10E3/UL (ref 3.4–10.8)

## 2023-02-22 DIAGNOSIS — E78.2 MIXED HYPERLIPIDEMIA: ICD-10-CM

## 2023-02-22 RX ORDER — PRAVASTATIN SODIUM 20 MG
20 TABLET ORAL NIGHTLY
Qty: 90 TABLET | Refills: 1 | Status: SHIPPED | OUTPATIENT
Start: 2023-02-22

## 2023-05-20 DIAGNOSIS — N18.30 CHRONIC RENAL INSUFFICIENCY, STAGE III (MODERATE): ICD-10-CM

## 2023-05-20 DIAGNOSIS — I10 ESSENTIAL HYPERTENSION: ICD-10-CM

## 2023-05-22 RX ORDER — LISINOPRIL 20 MG/1
TABLET ORAL
Qty: 90 TABLET | Refills: 3 | Status: SHIPPED | OUTPATIENT
Start: 2023-05-22

## 2023-09-06 ENCOUNTER — TELEPHONE (OUTPATIENT)
Dept: FAMILY MEDICINE CLINIC | Facility: CLINIC | Age: 78
End: 2023-09-06

## 2023-09-06 DIAGNOSIS — N18.31 CHRONIC RENAL IMPAIRMENT, STAGE 3A: ICD-10-CM

## 2023-09-06 DIAGNOSIS — I10 ESSENTIAL HYPERTENSION: Primary | ICD-10-CM

## 2023-09-06 DIAGNOSIS — E78.2 MIXED HYPERLIPIDEMIA: ICD-10-CM

## 2023-09-06 DIAGNOSIS — Z13.29 SCREENING FOR THYROID DISORDER: ICD-10-CM

## 2023-09-06 DIAGNOSIS — E55.9 VITAMIN D DEFICIENCY: ICD-10-CM

## 2023-09-06 DIAGNOSIS — R73.9 HYPERGLYCEMIA: ICD-10-CM

## 2023-09-06 NOTE — TELEPHONE ENCOUNTER
"Caller: Anjana Martínez \"Venecia\"    Relationship to patient: Self    Best call back number: [phone]     Chief complaint: FOLLOW UP CELLULITIS/LABS    Type of visit: OFFICE VISIT    Requested date: NEEDING BEFORE SEPTEMBER 22ND DUE TO [employer] MEDICARE REPLACEMENT INSURANCE. CAN PATIENT BE WORKED IN?  "

## 2023-09-11 ENCOUNTER — TELEPHONE (OUTPATIENT)
Dept: FAMILY MEDICINE CLINIC | Facility: CLINIC | Age: 78
End: 2023-09-11

## 2023-09-11 NOTE — TELEPHONE ENCOUNTER
"ATTEMPTED TO WARM TRANSFER BUT NO ANSWER    Caller: Anjana Martínez \"Venecia\"    Relationship to patient: Self    Best call back number: [phone]    Chief complaint: LABS    Type of visit: NURSE    Requested date: ASAP     "

## 2023-09-15 NOTE — PROGRESS NOTES
Chief Complaint  Hypertension and Hyperlipidemia    History of Present Illness  Anjana Martínez presents today for follow up on hypertension and hyperlipidemia.     Hypertension  Patient does check blood pressure at home. Occasionally   Home readings range from  110/78 at one visit in August at cardiologist.   Patient denies chest pain, blurred vision,fatigue, palpitations, shortness of breath or headaches.  Patient states medications is working well.     Hyperlipidemia  Patient is not following a low cholesterol diet.   Currently is on statin therapy.  Patient reports is exercising.     Do have chronic low back and knee pain with activity. Have had myalgias on Simvastatin and atorvastatin.    Fenofibrate did not cause any side effects.    Patient does have some concern with memory, mostly related to not remembering a book or a TV show.  Typically on a daily basis she is not forgetting anything major other than where she places her phone. Had a 28 of 30 on [employer] mini testing 1 year ago.    Patient Care Team:  Valeria Montes De Oca MD as PCP - General (Family Medicine)  EFRAIN Farr MD as Consulting Physician (Cardiology)  Sanford Bragg MD as Consulting Physician (Interventional Cardiology)   Current Outpatient Medications on File Prior to Visit   Medication Sig    acetaminophen (Tylenol 8 Hour Arthritis Pain) 650 MG 8 hr tablet Take 2 tablets by mouth Every 8 (Eight) Hours As Needed for Mild Pain or Moderate Pain.    Diclofenac Sodium (VOLTAREN) 1 % gel gel Apply 4 g topically to the appropriate area as directed 4 (Four) Times a Day. 4 grams    fluticasone (Flonase) 50 MCG/ACT nasal spray 2 sprays into the nostril(s) as directed by provider Daily.    lisinopril (PRINIVIL,ZESTRIL) 20 MG tablet TAKE 1 TABLET EVERY DAY    metoprolol succinate XL (TOPROL-XL) 25 MG 24 hr tablet Take 1 tablet by mouth Daily.    pravastatin (PRAVACHOL) 20 MG tablet TAKE 1 TABLET BY MOUTH EVERY NIGHT.    naproxen (Naprosyn) 500  "MG tablet 1-2 times daily as needed for pain (Patient not taking: Reported on 9/18/2023)    traMADol (ULTRAM) 50 MG tablet 1-2 every 8 hours if needed (Patient not taking: Reported on 9/18/2023)     No current facility-administered medications on file prior to visit.            Objective   Vital Signs:   /74 (BP Location: Right arm, Patient Position: Sitting, Cuff Size: Adult)   Pulse 90   Temp 97.8 °F (36.6 °C) (Temporal)   Resp 18   Ht 168.9 cm (66.5\")   Wt 93 kg (205 lb 2 oz)   SpO2 95% Comment: room air  BMI 32.61 kg/m²          Physical Exam  Vitals and nursing note reviewed.   Constitutional:       General: She is not in acute distress.     Appearance: She is well-developed. She is obese. She is not ill-appearing.   HENT:      Head: Normocephalic and atraumatic.   Cardiovascular:      Rate and Rhythm: Normal rate and regular rhythm.      Heart sounds: No murmur heard.  Pulmonary:      Effort: Pulmonary effort is normal.      Breath sounds: Normal breath sounds. No wheezing.   Musculoskeletal:         General: Normal range of motion.   Skin:     General: Skin is warm and dry.      Findings: No rash.   Neurological:      Mental Status: She is alert and oriented to person, place, and time.            No visits with results within 1 Day(s) from this visit.   Latest known visit with results is:   Results Encounter on 09/07/2023   Component Date Value Ref Range Status    Hemoglobin A1C 09/12/2023 6.0 (H)  4.8 - 5.6 % Final    Comment:          Prediabetes: 5.7 - 6.4           Diabetes: >6.4           Glycemic control for adults with diabetes: <7.0      Glucose 09/12/2023 107 (H)  70 - 99 mg/dL Final    BUN 09/12/2023 28 (H)  8 - 27 mg/dL Final    Creatinine 09/12/2023 1.49 (H)  0.57 - 1.00 mg/dL Final    EGFR Result 09/12/2023 36 (L)  >59 mL/min/1.73 Final    BUN/Creatinine Ratio 09/12/2023 19  12 - 28 Final    Sodium 09/12/2023 139  134 - 144 mmol/L Final    Potassium 09/12/2023 5.0  3.5 - 5.2 mmol/L " Final    Chloride 09/12/2023 103  96 - 106 mmol/L Final    Total CO2 09/12/2023 19 (L)  20 - 29 mmol/L Final    Calcium 09/12/2023 10.0  8.7 - 10.3 mg/dL Final    Total Protein 09/12/2023 7.2  6.0 - 8.5 g/dL Final    Albumin 09/12/2023 4.5  3.8 - 4.8 g/dL Final    Globulin 09/12/2023 2.7  1.5 - 4.5 g/dL Final    A/G Ratio 09/12/2023 1.7  1.2 - 2.2 Final    Total Bilirubin 09/12/2023 0.5  0.0 - 1.2 mg/dL Final    Alkaline Phosphatase 09/12/2023 76  44 - 121 IU/L Final    AST (SGOT) 09/12/2023 19  0 - 40 IU/L Final    ALT (SGPT) 09/12/2023 21  0 - 32 IU/L Final    Total Cholesterol 09/12/2023 219 (H)  100 - 199 mg/dL Final    Triglycerides 09/12/2023 324 (H)  0 - 149 mg/dL Final    HDL Cholesterol 09/12/2023 40  >39 mg/dL Final    VLDL Cholesterol Dario 09/12/2023 57 (H)  5 - 40 mg/dL Final    LDL Chol Calc (NIH) 09/12/2023 122 (H)  0 - 99 mg/dL Final    WBC 09/12/2023 7.3  3.4 - 10.8 x10E3/uL Final    RBC 09/12/2023 4.47  3.77 - 5.28 x10E6/uL Final    Hemoglobin 09/12/2023 12.9  11.1 - 15.9 g/dL Final    Hematocrit 09/12/2023 39.5  34.0 - 46.6 % Final    MCV 09/12/2023 88  79 - 97 fL Final    MCH 09/12/2023 28.9  26.6 - 33.0 pg Final    MCHC 09/12/2023 32.7  31.5 - 35.7 g/dL Final    RDW 09/12/2023 12.9  11.7 - 15.4 % Final    Platelets 09/12/2023 236  150 - 450 x10E3/uL Final    Neutrophil Rel % 09/12/2023 55  Not Estab. % Final    Lymphocyte Rel % 09/12/2023 31  Not Estab. % Final    Monocyte Rel % 09/12/2023 8  Not Estab. % Final    Eosinophil Rel % 09/12/2023 6  Not Estab. % Final    Basophil Rel % 09/12/2023 0  Not Estab. % Final    Neutrophils Absolute 09/12/2023 4.0  1.4 - 7.0 x10E3/uL Final    Lymphocytes Absolute 09/12/2023 2.3  0.7 - 3.1 x10E3/uL Final    Monocytes Absolute 09/12/2023 0.6  0.1 - 0.9 x10E3/uL Final    Eosinophils Absolute 09/12/2023 0.5 (H)  0.0 - 0.4 x10E3/uL Final    Basophils Absolute 09/12/2023 0.0  0.0 - 0.2 x10E3/uL Final    Immature Granulocyte Rel % 09/12/2023 0  Not Estab. % Final     Immature Grans Absolute 09/12/2023 0.0  0.0 - 0.1 x10E3/uL Final    25 Hydroxy, Vitamin D 09/12/2023 33.4  30.0 - 100.0 ng/mL Final    Comment: Vitamin D deficiency has been defined by the [facility] and an Endocrine Society practice guideline as a  level of serum 25-OH vitamin D less than 20 ng/mL (1,2).  The Endocrine Society went on to further define vitamin D  insufficiency as a level between 21 and 29 ng/mL (2).  1. IOM ([facility] [employer]). 2010. Dietary reference     intakes for calcium and D. [place]: The     National Academies Press.  2. Muna MF, Melly NC, Blair DAMICO, et al.     Evaluation, treatment, and prevention of vitamin D     deficiency: an Endocrine Society clinical practice     guideline. JCEM. 2011 Jul; 96(7):1911-30.      TSH 09/12/2023 3.090  0.450 - 4.500 uIU/mL Final    Microalbumin, Urine 09/12/2023 4.0  Not Estab. ug/mL Final     A1C Last 3 Results          12/6/2022    10:01 9/12/2023    10:00   HGBA1C Last 3 Results   Hemoglobin A1C 5.9  6.0      Lab Results   Component Value Date    CHOL 255 (H) 04/04/2019    CHLPL 219 (H) 09/12/2023    TRIG 324 (H) 09/12/2023    HDL 40 09/12/2023     (H) 09/12/2023     Lab Results   Component Value Date    TSH 3.090 09/12/2023     Lab Results   Component Value Date    GLUCOSE 107 (H) 09/12/2023    BUN 28 (H) 09/12/2023    CREATININE 1.49 (H) 09/12/2023    EGFRIFNONA 41 (L) 08/31/2021    EGFRIFAFRI 47 (L) 08/31/2021    BCR 19 09/12/2023    K 5.0 09/12/2023    CO2 19 (L) 09/12/2023    CALCIUM 10.0 09/12/2023    PROTENTOTREF 7.2 09/12/2023    ALBUMIN 4.5 09/12/2023    LABIL2 1.7 09/12/2023    AST 19 09/12/2023    ALT 21 09/12/2023     Lab Results   Component Value Date    WBC 7.3 09/12/2023    HGB 12.9 09/12/2023    HCT 39.5 09/12/2023    MCV 88 09/12/2023     09/12/2023                      Assessment and Plan    Diagnoses and all orders for this visit:    1. Primary hypertension (Primary)    2. Mixed  hyperlipidemia    3. Class 1 obesity due to excess calories with serious comorbidity and body mass index (BMI) of 32.0 to 32.9 in adult  Assessment & Plan:  Patient's (Body mass index is 32.61 kg/m².) indicates that they are obese (BMI >30) with health conditions that include hypertension and dyslipidemias . Weight is unchanged. BMI  is above average; BMI management plan is completed. We discussed portion control and increasing exercise.       4. Memory change    5. Vitamin D deficiency  -     Cholecalciferol 50 MCG (2000 UT) capsule; Take 1 capsule by mouth Daily.  Dispense: 30 each; Refill: 12    Hypertension at goal continue current medications.  Hyperlipidemia, patient has recently seen her new cardiologist at [facility],  Sanford Bragg MD, [facility], on 8/25/2023   He has recently run some tests looking for peripheral arterial disease.  Patient does not have history of coronary artery disease or peripheral arterial disease or stroke.  Patient has aged out of recommendation of statin therapy for primary prevention.  Due to potential concern with statin impairing her memory and causing leg and back pain we will do trial without medication and recheck labs in about 3 months.    Patient does currently have [employer] insurance which [facility] may be dropping.  We will try to get her back in for a Medicare wellness for a memory test.  Did also advise that she could look into other alternative insurance if necessary during open enrollment in October.  Vitamin D deficiency reordering cholecalciferol 2000 units daily  .    Medications Discontinued During This Encounter   Medication Reason    Cholecalciferol 50 MCG (2000 UT) capsule Reorder         Follow Up     Return in about 1 week (around 9/25/2023) for Recheck, Medicare Wellness 3 month for recheck with labs.    Patient was given instructions and counseling regarding her condition or for health maintenance advice. Please see specific  information pulled into the AVS if appropriate.

## 2023-09-18 ENCOUNTER — OFFICE VISIT (OUTPATIENT)
Dept: FAMILY MEDICINE CLINIC | Facility: CLINIC | Age: 78
End: 2023-09-18
Payer: MEDICARE

## 2023-09-18 VITALS
WEIGHT: 205.13 LBS | BODY MASS INDEX: 32.2 KG/M2 | SYSTOLIC BLOOD PRESSURE: 122 MMHG | DIASTOLIC BLOOD PRESSURE: 74 MMHG | RESPIRATION RATE: 18 BRPM | HEART RATE: 90 BPM | HEIGHT: 67 IN | TEMPERATURE: 97.8 F | OXYGEN SATURATION: 95 %

## 2023-09-18 DIAGNOSIS — E55.9 VITAMIN D DEFICIENCY: ICD-10-CM

## 2023-09-18 DIAGNOSIS — E78.2 MIXED HYPERLIPIDEMIA: ICD-10-CM

## 2023-09-18 DIAGNOSIS — E66.09 CLASS 1 OBESITY DUE TO EXCESS CALORIES WITH SERIOUS COMORBIDITY AND BODY MASS INDEX (BMI) OF 32.0 TO 32.9 IN ADULT: ICD-10-CM

## 2023-09-18 DIAGNOSIS — R41.3 MEMORY CHANGE: ICD-10-CM

## 2023-09-18 DIAGNOSIS — I10 PRIMARY HYPERTENSION: Primary | ICD-10-CM

## 2023-09-18 PROCEDURE — 99214 OFFICE O/P EST MOD 30 MIN: CPT | Performed by: FAMILY MEDICINE

## 2023-09-18 PROCEDURE — 3074F SYST BP LT 130 MM HG: CPT | Performed by: FAMILY MEDICINE

## 2023-09-18 PROCEDURE — 1159F MED LIST DOCD IN RCRD: CPT | Performed by: FAMILY MEDICINE

## 2023-09-18 PROCEDURE — 3078F DIAST BP <80 MM HG: CPT | Performed by: FAMILY MEDICINE

## 2023-09-18 PROCEDURE — 1160F RVW MEDS BY RX/DR IN RCRD: CPT | Performed by: FAMILY MEDICINE

## 2023-09-18 NOTE — ASSESSMENT & PLAN NOTE
Patient's (Body mass index is 32.61 kg/m².) indicates that they are obese (BMI >30) with health conditions that include hypertension and dyslipidemias . Weight is unchanged. BMI  is above average; BMI management plan is completed. We discussed portion control and increasing exercise.

## 2024-05-21 ENCOUNTER — TRANSCRIBE ORDERS (OUTPATIENT)
Dept: FAMILY MEDICINE CLINIC | Facility: CLINIC | Age: 79
End: 2024-05-21
Payer: MEDICARE

## 2024-05-21 DIAGNOSIS — Z12.31 VISIT FOR SCREENING MAMMOGRAM: Primary | ICD-10-CM

## 2024-06-04 ENCOUNTER — HOSPITAL ENCOUNTER (OUTPATIENT)
Dept: MAMMOGRAPHY | Facility: HOSPITAL | Age: 79
Discharge: HOME | End: 2024-06-04
Payer: MEDICARE

## 2024-06-04 DIAGNOSIS — Z12.31 VISIT FOR SCREENING MAMMOGRAM: ICD-10-CM

## 2024-06-04 PROCEDURE — 77063 BREAST TOMOSYNTHESIS BI: CPT

## 2024-06-04 PROCEDURE — 77067 SCR MAMMO BI INCL CAD: CPT

## 2024-12-23 ENCOUNTER — TRANSCRIBE ORDERS (OUTPATIENT)
Dept: ADMINISTRATIVE | Facility: HOSPITAL | Age: 79
End: 2024-12-23
Payer: MEDICARE

## 2024-12-23 DIAGNOSIS — N18.30 STAGE 3 CHRONIC KIDNEY DISEASE, UNSPECIFIED WHETHER STAGE 3A OR 3B CKD: Primary | ICD-10-CM

## 2024-12-30 ENCOUNTER — HOSPITAL ENCOUNTER (OUTPATIENT)
Dept: ULTRASOUND IMAGING | Facility: HOSPITAL | Age: 79
Discharge: HOME | End: 2024-12-30
Admitting: INTERNAL MEDICINE
Payer: MEDICARE

## 2024-12-30 DIAGNOSIS — N18.30 STAGE 3 CHRONIC KIDNEY DISEASE, UNSPECIFIED WHETHER STAGE 3A OR 3B CKD: ICD-10-CM

## 2024-12-30 PROCEDURE — 76775 US EXAM ABDO BACK WALL LIM: CPT
